# Patient Record
Sex: MALE | Race: WHITE | NOT HISPANIC OR LATINO | Employment: OTHER | URBAN - METROPOLITAN AREA
[De-identification: names, ages, dates, MRNs, and addresses within clinical notes are randomized per-mention and may not be internally consistent; named-entity substitution may affect disease eponyms.]

---

## 2017-04-18 ENCOUNTER — OFFICE VISIT (OUTPATIENT)
Dept: MEDICAL GROUP | Facility: MEDICAL CENTER | Age: 57
End: 2017-04-18
Payer: COMMERCIAL

## 2017-04-18 VITALS
OXYGEN SATURATION: 96 % | DIASTOLIC BLOOD PRESSURE: 90 MMHG | RESPIRATION RATE: 16 BRPM | SYSTOLIC BLOOD PRESSURE: 152 MMHG | HEIGHT: 70 IN | HEART RATE: 72 BPM | BODY MASS INDEX: 30.24 KG/M2 | WEIGHT: 211.2 LBS | TEMPERATURE: 98 F

## 2017-04-18 DIAGNOSIS — E83.119 HEMOCHROMATOSIS, UNSPECIFIED HEMOCHROMATOSIS TYPE: ICD-10-CM

## 2017-04-18 DIAGNOSIS — N18.30 CHRONIC RENAL INSUFFICIENCY, STAGE III (MODERATE) (HCC): ICD-10-CM

## 2017-04-18 DIAGNOSIS — E78.49 OTHER HYPERLIPIDEMIA: ICD-10-CM

## 2017-04-18 DIAGNOSIS — E79.0 HYPERURICEMIA: ICD-10-CM

## 2017-04-18 DIAGNOSIS — K76.0 FATTY LIVER: ICD-10-CM

## 2017-04-18 DIAGNOSIS — Z12.5 SCREENING PSA (PROSTATE SPECIFIC ANTIGEN): ICD-10-CM

## 2017-04-18 PROCEDURE — 99214 OFFICE O/P EST MOD 30 MIN: CPT | Performed by: PHYSICIAN ASSISTANT

## 2017-04-18 RX ORDER — LOVASTATIN 20 MG/1
TABLET ORAL
Qty: 90 TAB | Refills: 3 | Status: SHIPPED | OUTPATIENT
Start: 2017-04-18 | End: 2018-04-03 | Stop reason: SDUPTHER

## 2017-04-18 RX ORDER — ALLOPURINOL 300 MG/1
TABLET ORAL
Qty: 90 TAB | Refills: 3 | Status: SHIPPED | OUTPATIENT
Start: 2017-04-18 | End: 2018-04-20 | Stop reason: SDUPTHER

## 2017-04-18 NOTE — ASSESSMENT & PLAN NOTE
Chronic. On allopurinol. States he was told by podiatrist that he doesn't have gout. Due for uric acid level.

## 2017-04-18 NOTE — MR AVS SNAPSHOT
"        Charlie Hernandezandreina   2017 7:20 AM   Office Visit   MRN: [id number]    Department:  [facility]   Dept Phone:  [phone]    Description:  Male : 1960   Provider:  Felisha Callahan PA-C           Reason for Visit     Establish Care     Medication Refill Lovastatin, Allopurinoll      Allergies as of 2017     No Known Allergies      You were diagnosed with     Other hyperlipidemia   [1373123]       Chronic renal insufficiency, stage III (moderate)   [703230]       Fatty liver   [061038]       Hemochromatosis, unspecified hemochromatosis type   [7749101]       Hyperuricemia   [419568]       Screening PSA (prostate specific antigen)   [637527]         Vital Signs     Blood Pressure Pulse Temperature Respirations Height Weight    152/90 mmHg 72 36.7 °C (98 °F) 16 1.778 m (5' 10\") 95.8 kg (211 lb 3.2 oz)    Body Mass Index Oxygen Saturation Smoking Status             30.30 kg/m2 96% Never Smoker          Basic Information     Date Of Birth Sex Race Ethnicity Preferred Language    1960 Male White Non- English      Problem List              ICD-10-CM Priority Class Noted - Resolved    Hyperlipidemia E78.5   2010 - Present    Chronic renal insufficiency, stage III (moderate) N18.3   2013 - Present    Fatty liver K76.0   2013 - Present    Hemochromatosis E83.119   2014 - Present    Gout, arthropathy M10.9   2015 - Present    Hyperuricemia E79.0   2015 - Present      Health Maintenance        Date Due Completion Dates    PSA Screening 2015, 10/19/2010    COLONOSCOPY 2019 (N/S)    Override on 2009: (N/S)    IMM DTaP/Tdap/Td Vaccine (2 - Td) 2025            Current Immunizations     Influenza TIV (IM) 10/23/2013  8:15 AM, 10/31/2011  8:20 AM    Influenza Vaccine Quad Inj (Pf) 2015  8:20 AM, 11/3/2014  8:35 AM    Influenza Vaccine Quad Inj (Preserved) 2016  8:30 AM    Tdap Vaccine 2015  8:20 AM      Below " and/or attached are the medications your provider expects you to take. Review all of your home medications and newly ordered medications with your provider and/or pharmacist. Follow medication instructions as directed by your provider and/or pharmacist. Please keep your medication list with you and share with your provider. Update the information when medications are discontinued, doses are changed, or new medications (including over-the-counter products) are added; and carry medication information at all times in the event of emergency situations     Allergies:  No Known Allergies          Medications  Valid as of: April 18, 2017 -  7:50 AM    Generic Name Brand Name Tablet Size Instructions for use    Acyclovir (Cream) ZOVIRAX 5 % Apply as directed        Allopurinol (Tab) ZYLOPRIM 300 MG TAKE ONE TABLET BY MOUTH ONCE DAILY        Aspirin (Tab) aspirin 81 MG Take  by mouth every day.        L-Methylfolate-Algae-B12-B6 (Cap) METANX 3-90.314-2-35 MG Take  by mouth 2 Times a Day.        Lovastatin (Tab) MEVACOR 20 MG TAKE ONE TABLET BY MOUTH ONCE DAILY        ValACYclovir HCl (Tab) VALTREX 1 GM TAKE TWO TABLETS BY MOUTH EVERY 12 HOURS TAKE FOR 1 DAY AS NEEDED FOR OUTBREAKS        .                 Medicines prescribed today were sent to:     [facility] [address] [place] [address] (S), [place] - [address] [id number] [facility] (S) [place] [address]    Phone: [phone] Fax: [phone]    Open 24 Hours?: No      Medication refill instructions:       If your prescription bottle indicates you have medication refills left, it is not necessary to call your provider’s office. Please contact your pharmacy and they will refill your medication.    If your prescription bottle indicates you do not have any refills left, you may request refills at any time through one of the following ways: The online [employer] system (except Urgent Care), by calling your provider’s office, or by asking your pharmacy to contact your provider’s office with  a refill request. Medication refills are processed only during regular business hours and may not be available until the next business day. Your provider may request additional information or to have a follow-up visit with you prior to refilling your medication.   *Please Note: Medication refills are assigned a new Rx number when refilled electronically. Your pharmacy may indicate that no refills were authorized even though a new prescription for the same medication is available at the pharmacy. Please request the medicine by name with the pharmacy before contacting your provider for a refill.        Your To Do List     Future Labs/Procedures Complete By Expires    CBC WITH DIFFERENTIAL  As directed 4/18/2018    COMP METABOLIC PANEL  As directed 4/18/2018    FERRITIN  As directed 4/18/2018    LIPID PROFILE  As directed 4/18/2018    PROSTATE SPECIFIC AG SCREENING  As directed 4/18/2018         BetaVersityhart Access Code: Activation code not generated  Current NewACT Status: Active

## 2017-04-18 NOTE — PROGRESS NOTES
"Subjective:   Charlie Avila is a 57 y.o. male here today for medication refills and lab order. Doing well. No changes since last visit 11/2016.    Hyperlipidemia  Chronic stable on lovastatin. Due for labs.    Chronic renal insufficiency, stage III (moderate)  Chronic, stable. Due for labs.    Hemochromatosis  Chronic, stable. Goes for phlebotomy 3 times per year. Due for CBC and ferritin check.    Hyperuricemia  Chronic. On allopurinol. States he was told by podiatrist that he doesn't have gout. Due for uric acid level.     HSV - recurrent but no recent outbreaks.    Current medicines (including changes today)  Current Outpatient Prescriptions   Medication Sig Dispense Refill   • allopurinol (ZYLOPRIM) 300 MG Tab TAKE ONE TABLET BY MOUTH ONCE DAILY 90 Tab 3   • lovastatin (MEVACOR) 20 MG Tab TAKE ONE TABLET BY MOUTH ONCE DAILY 90 Tab 3   • ASPIRIN 81 MG tablet Take  by mouth every day.     • valacyclovir (VALTREX) 1 GM TABS TAKE TWO TABLETS BY MOUTH EVERY 12 HOURS TAKE FOR 1 DAY AS NEEDED FOR OUTBREAKS 4 Tab 6   • acyclovir (ZOVIRAX) 5 % CREA Apply as directed 1 Tube 6   • L-Methylfolate-Algae-B12-B6 (METANX) 3-90.314-2-35 MG CAPS Take  by mouth 2 Times a Day.       No current facility-administered medications for this visit.     He  has a past medical history of Hyperlipidemia (4/26/2010); HEMOCHROMATOSIS (4/26/2010); HSV-1 (herpes simplex virus 1) infection; and Fatty liver (4/30/2013). He also has no past medical history of Hypertension.    ROS   No fever/chills. No weight change. No headache/dizziness. No focal weakness. No sore throat, nasal congestion, ear pain. No chest pain, no shortness of breath, difficulty breathing. No n/v/d/c or abdominal pain. No urinary complaint. No rash or skin lesion. No joint pain or swelling.       Objective:     Blood pressure 152/90, pulse 72, temperature 36.7 °C (98 °F), resp. rate 16, height 1.778 m (5' 10\"), weight 95.8 kg (211 lb 3.2 oz), SpO2 96 %. Body mass index is 30.3 " kg/(m^2).   Physical Exam:  Constitutional: WDWN, NAD  Skin: Warm, dry, good turgor, no rashes in visible areas.  Eye: Equal, round and reactive, conjunctiva clear, lids normal.  ENMT: Lips without lesions, good dentition, oropharynx clear.  Neck: Trachea midline, no masses, no thyromegaly. No cervical or supraclavicular lymphadenopathy  Respiratory: Unlabored respiratory effort, lungs clear to auscultation, no wheezes, no ronchi.  Cardiovascular: Normal S1, S2, no murmur, no leg edema.  Psych: Alert and oriented x3, normal affect and mood.        Assessment and Plan:   The following treatment plan was discussed    1. Other hyperlipidemia  Cont current tx  - lovastatin (MEVACOR) 20 MG Tab; TAKE ONE TABLET BY MOUTH ONCE DAILY  Dispense: 90 Tab; Refill: 3  - LIPID PROFILE; Future    2. Chronic renal insufficiency, stage III (moderate)  Cont avoid nephrotoxins  - COMP METABOLIC PANEL; Future    3. Fatty liver    - COMP METABOLIC PANEL; Future    4. Hemochromatosis, unspecified hemochromatosis type  Cont current phlebotomy 3 times per year  - CBC WITH DIFFERENTIAL; Future  - FERRITIN; Future    5. Hyperuricemia    - allopurinol (ZYLOPRIM) 300 MG Tab; TAKE ONE TABLET BY MOUTH ONCE DAILY  Dispense: 90 Tab; Refill: 3  - URIC ACID, SERUM    6. Screening PSA (prostate specific antigen)    - PROSTATE SPECIFIC AG SCREENING; Future    Will call with lab results. meds refilled    Followup: Return in about 6 months (around 10/18/2017).

## 2017-09-25 ENCOUNTER — TELEPHONE (OUTPATIENT)
Dept: MEDICAL GROUP | Facility: MEDICAL CENTER | Age: 57
End: 2017-09-25

## 2017-09-25 NOTE — TELEPHONE ENCOUNTER
Future Appointments       Provider Department    10/18/2017 7:20 AM Felisha Callahan P.A.-C. [facility]        Pt. With an upcoming mayte----needs to know if you'd like for him to get labs prior his mayte??  Felisha, please order blood tests or advise....

## 2017-09-26 NOTE — TELEPHONE ENCOUNTER
"Per Emperatriz  \"It looks like he had normal labs in April. I'll probably check a HgA1C in the office but other than that I don't need anything prior to the visit. Thanks! Felisha\"  I spoke with pt and informed. LP  "

## 2017-10-18 ENCOUNTER — OFFICE VISIT (OUTPATIENT)
Dept: MEDICAL GROUP | Facility: MEDICAL CENTER | Age: 57
End: 2017-10-18
Payer: COMMERCIAL

## 2017-10-18 VITALS
DIASTOLIC BLOOD PRESSURE: 70 MMHG | OXYGEN SATURATION: 96 % | SYSTOLIC BLOOD PRESSURE: 120 MMHG | WEIGHT: 197 LBS | HEART RATE: 80 BPM | TEMPERATURE: 98.6 F | HEIGHT: 70 IN | BODY MASS INDEX: 28.2 KG/M2 | RESPIRATION RATE: 16 BRPM

## 2017-10-18 DIAGNOSIS — E79.0 HYPERURICEMIA: ICD-10-CM

## 2017-10-18 DIAGNOSIS — Z23 NEED FOR INFLUENZA VACCINATION: ICD-10-CM

## 2017-10-18 DIAGNOSIS — E83.119 HEMOCHROMATOSIS, UNSPECIFIED HEMOCHROMATOSIS TYPE: ICD-10-CM

## 2017-10-18 DIAGNOSIS — E78.49 OTHER HYPERLIPIDEMIA: ICD-10-CM

## 2017-10-18 DIAGNOSIS — M10.9 GOUT, ARTHROPATHY: ICD-10-CM

## 2017-10-18 DIAGNOSIS — F43.9 STRESS AT HOME: ICD-10-CM

## 2017-10-18 DIAGNOSIS — K76.0 FATTY LIVER: ICD-10-CM

## 2017-10-18 PROCEDURE — 99214 OFFICE O/P EST MOD 30 MIN: CPT | Mod: 25 | Performed by: PHYSICIAN ASSISTANT

## 2017-10-18 PROCEDURE — 90686 IIV4 VACC NO PRSV 0.5 ML IM: CPT | Performed by: PHYSICIAN ASSISTANT

## 2017-10-18 PROCEDURE — 90471 IMMUNIZATION ADMIN: CPT | Performed by: PHYSICIAN ASSISTANT

## 2017-10-20 PROBLEM — F43.9 STRESS AT HOME: Status: ACTIVE | Noted: 2017-10-20

## 2017-10-20 NOTE — ASSESSMENT & PLAN NOTE
Has not been dx as gout but gets gout like swelling and pain in feet. On allopurinol prevents attacks. Normal uric acid level.

## 2017-10-20 NOTE — PROGRESS NOTES
"Subjective:   Charlie Avila is a 57 y.o. male here today for cholesterol, gout, general f/u    Hyperlipidemia  Most recent labs from 2017:  Total cholesterol: 179  T  HDL: 35  LDL: 115    Hemochromatosis  Hx of. Treated with twice yearly therapeutic phlebotomy    Gout, arthropathy  Has not been dx as gout but gets gout like swelling and pain in feet. On allopurinol prevents attacks. Normal uric acid level.    Hyperuricemia  On allopurinol. Most recent uric acid level normal.    Stress at home  Long term girlfriend moved out few months ago, took money, very stressful.     Labs from 2017 reviewed. Due for new labs    Current medicines (including changes today)  Current Outpatient Prescriptions   Medication Sig Dispense Refill   • allopurinol (ZYLOPRIM) 300 MG Tab TAKE ONE TABLET BY MOUTH ONCE DAILY 90 Tab 3   • lovastatin (MEVACOR) 20 MG Tab TAKE ONE TABLET BY MOUTH ONCE DAILY 90 Tab 3   • ASPIRIN 81 MG tablet Take  by mouth every day.     • valacyclovir (VALTREX) 1 GM TABS TAKE TWO TABLETS BY MOUTH EVERY 12 HOURS TAKE FOR 1 DAY AS NEEDED FOR OUTBREAKS 4 Tab 6   • L-Methylfolate-Algae-B12-B6 (METANX) 3-90.314-2-35 MG CAPS Take  by mouth 2 Times a Day.       No current facility-administered medications for this visit.      He  has a past medical history of Fatty liver (2013); HEMOCHROMATOSIS (2010); HSV-1 (herpes simplex virus 1) infection; and Hyperlipidemia (2010). He also has no past medical history of Hypertension.    ROS   No fever/chills. No weight change. No headache/dizziness. No focal weakness. No sore throat, nasal congestion, ear pain. No chest pain, no shortness of breath, difficulty breathing. No n/v/d/c or abdominal pain. No urinary complaint. No rash or skin lesion. No joint pain or swelling.       Objective:     Blood pressure 120/70, pulse 80, temperature 37 °C (98.6 °F), resp. rate 16, height 1.778 m (5' 10\"), weight 89.4 kg (197 lb), SpO2 96 %. Body mass index is 28.27 kg/m². "   Physical Exam:  Constitutional: WDWN, NAD  Skin: Warm, dry, good turgor, no rashes in visible areas.  Neck: Trachea midline, no masses, no thyromegaly. No cervical or supraclavicular lymphadenopathy  Respiratory: Unlabored respiratory effort, lungs clear to auscultation, no wheezes, no ronchi.  Cardiovascular: Normal S1, S2, no murmur, no leg edema.  Psych: Alert and oriented x3, normal affect and mood.        Assessment and Plan:   The following treatment plan was discussed. Cont current meds. Diet and exercise discussed.    1. Other hyperlipidemia    - LIPID PROFILE; Future    2. Fatty liver    - COMP METABOLIC PANEL; Future    3. Need for influenza vaccination    - INFLUENZA VACCINE QUAD INJ >3Y(PF)    4. Hemochromatosis, unspecified hemochromatosis type      5. Gout, arthropathy      6. Hyperuricemia      7. Stress at home        Followup: Return in about 6 months (around 4/18/2018).

## 2018-04-03 DIAGNOSIS — E78.49 OTHER HYPERLIPIDEMIA: ICD-10-CM

## 2018-04-03 RX ORDER — LOVASTATIN 20 MG/1
TABLET ORAL
Qty: 90 TAB | Refills: 3 | Status: SHIPPED | OUTPATIENT
Start: 2018-04-03 | End: 2019-04-01 | Stop reason: SDUPTHER

## 2018-04-03 NOTE — TELEPHONE ENCOUNTER
Was the patient seen in the last year in this department? Yes     Does patient have an active prescription for medications requested? Yes     Received Request Via: Pharmacy       Last lipid panel 10/2017

## 2018-04-16 ENCOUNTER — OFFICE VISIT (OUTPATIENT)
Dept: MEDICAL GROUP | Facility: MEDICAL CENTER | Age: 58
End: 2018-04-16
Payer: COMMERCIAL

## 2018-04-16 VITALS
OXYGEN SATURATION: 95 % | DIASTOLIC BLOOD PRESSURE: 84 MMHG | RESPIRATION RATE: 16 BRPM | WEIGHT: 205.6 LBS | HEIGHT: 70 IN | HEART RATE: 60 BPM | SYSTOLIC BLOOD PRESSURE: 128 MMHG | BODY MASS INDEX: 29.43 KG/M2

## 2018-04-16 DIAGNOSIS — Z12.5 SCREENING PSA (PROSTATE SPECIFIC ANTIGEN): ICD-10-CM

## 2018-04-16 DIAGNOSIS — Z00.00 WELLNESS EXAMINATION: ICD-10-CM

## 2018-04-16 DIAGNOSIS — M10.9 GOUT, ARTHROPATHY: ICD-10-CM

## 2018-04-16 DIAGNOSIS — K76.0 FATTY LIVER: ICD-10-CM

## 2018-04-16 DIAGNOSIS — E78.49 OTHER HYPERLIPIDEMIA: ICD-10-CM

## 2018-04-16 DIAGNOSIS — E83.119 HEMOCHROMATOSIS, UNSPECIFIED HEMOCHROMATOSIS TYPE: ICD-10-CM

## 2018-04-16 PROCEDURE — 99396 PREV VISIT EST AGE 40-64: CPT | Performed by: PHYSICIAN ASSISTANT

## 2018-04-16 ASSESSMENT — PATIENT HEALTH QUESTIONNAIRE - PHQ9: CLINICAL INTERPRETATION OF PHQ2 SCORE: 0

## 2018-04-17 PROBLEM — F43.9 STRESS AT HOME: Status: RESOLVED | Noted: 2017-10-20 | Resolved: 2018-04-17

## 2018-04-17 NOTE — PROGRESS NOTES
"Subjective:   Charlie Avila is a 58 y.o. male here today for general wellness exam. Doing well overall.     Gout, arthropathy  No gout flare. Will continue with the allopurinol.    Hyperlipidemia  Chronic, stable on current lovastatin 20mg daily, no concerns, labs up to date    Hemochromatosis  Chronic, stable on current therapeutic phlebotomy       Current medicines (including changes today)  Current Outpatient Prescriptions   Medication Sig Dispense Refill   • lovastatin (MEVACOR) 20 MG Tab TAKE ONE TABLET BY MOUTH ONCE DAILY 90 Tab 3   • valacyclovir (VALTREX) 1 GM TABS TAKE TWO TABLETS BY MOUTH EVERY 12 HOURS TAKE FOR 1 DAY AS NEEDED FOR OUTBREAKS 4 Tab 6   • L-Methylfolate-Algae-B12-B6 (METANX) 3-90.314-2-35 MG CAPS Take  by mouth 2 Times a Day.     • ASPIRIN 81 MG tablet Take  by mouth every day.     • allopurinol (ZYLOPRIM) 300 MG Tab TAKE ONE TABLET BY MOUTH ONCE DAILY 90 Tab 3     No current facility-administered medications for this visit.      He  has a past medical history of Fatty liver (4/30/2013); HEMOCHROMATOSIS (4/26/2010); HSV-1 (herpes simplex virus 1) infection; and Hyperlipidemia (4/26/2010).    ROS  No fever/chills. No weight change. No headache/dizziness. No focal weakness. No sore throat, nasal congestion, ear pain. No chest pain, no shortness of breath, difficulty breathing. No n/v/d/c or abdominal pain. No urinary complaint. No rash or skin lesion. No joint pain or swelling.       Objective:     Blood pressure 128/84, pulse 60, resp. rate 16, height 1.778 m (5' 10\"), weight 93.3 kg (205 lb 9.6 oz), SpO2 95 %. Body mass index is 29.5 kg/m².   Physical Exam:  Constitutional: WDWN, NAD  Skin: Warm, dry, good turgor, no rashes in visible areas.  Eye: Equal, round and reactive, conjunctiva clear, lids normal.  ENMT: Lips without lesions, good dentition, oropharynx clear.  Neck: Trachea midline, no masses, no thyromegaly. No cervical or supraclavicular lymphadenopathy  Respiratory: Unlabored " respiratory effort, lungs clear to auscultation, no wheezes, no ronchi.  Cardiovascular: Normal S1, S2, no murmur, no leg edema.  Abdomen: Soft, non-tender, no masses, no hepatosplenomegaly.  Psych: Alert and oriented x3, normal affect and mood.        Assessment and Plan:   The following treatment plan was discussed    1. Wellness examination      2. Other hyperlipidemia    - LIPID PROFILE; Future    3. Gout, arthropathy      4. Hemochromatosis, unspecified hemochromatosis type    - CBC WITH DIFFERENTIAL; Future  - FERRITIN; Future  - IRON/TOTAL IRON BIND; Future    5. Fatty liver    - COMP METABOLIC PANEL; Future    6. Screening PSA (prostate specific antigen)    - PROSTATE SPECIFIC AG SCREENING; Future      Followup: Return in about 1 year (around 4/16/2019).

## 2018-04-20 DIAGNOSIS — E79.0 HYPERURICEMIA: ICD-10-CM

## 2018-04-20 RX ORDER — ALLOPURINOL 300 MG/1
TABLET ORAL
Qty: 90 TAB | Refills: 3 | Status: SHIPPED | OUTPATIENT
Start: 2018-04-20 | End: 2018-11-05 | Stop reason: SDUPTHER

## 2018-10-03 DIAGNOSIS — Z12.5 SCREENING PSA (PROSTATE SPECIFIC ANTIGEN): ICD-10-CM

## 2018-10-03 DIAGNOSIS — E78.49 OTHER HYPERLIPIDEMIA: ICD-10-CM

## 2018-10-03 DIAGNOSIS — E83.119 HEMOCHROMATOSIS, UNSPECIFIED HEMOCHROMATOSIS TYPE: ICD-10-CM

## 2018-10-03 DIAGNOSIS — K76.0 FATTY LIVER: ICD-10-CM

## 2018-10-15 ENCOUNTER — NON-PROVIDER VISIT (OUTPATIENT)
Dept: MEDICAL GROUP | Facility: MEDICAL CENTER | Age: 58
End: 2018-10-15
Payer: COMMERCIAL

## 2018-10-15 DIAGNOSIS — Z23 NEED FOR VACCINATION: ICD-10-CM

## 2018-10-15 PROCEDURE — 90471 IMMUNIZATION ADMIN: CPT | Performed by: FAMILY MEDICINE

## 2018-10-15 PROCEDURE — 90686 IIV4 VACC NO PRSV 0.5 ML IM: CPT | Performed by: FAMILY MEDICINE

## 2018-11-05 DIAGNOSIS — E79.0 HYPERURICEMIA: ICD-10-CM

## 2018-11-05 RX ORDER — ALLOPURINOL 300 MG/1
TABLET ORAL
Qty: 90 TAB | Refills: 3 | Status: SHIPPED | OUTPATIENT
Start: 2018-11-05 | End: 2019-11-18 | Stop reason: SDUPTHER

## 2018-11-05 NOTE — TELEPHONE ENCOUNTER
Was the patient seen in the last year in this department? Yes    Does patient have an active prescription for medications requested? Yes    Received Request Via: Pharmacy     Pt switched pharmacies, requesting a 90 day supply be  sent to [facility] on [address].    Please advise, thank you.

## 2019-04-01 DIAGNOSIS — E78.49 OTHER HYPERLIPIDEMIA: ICD-10-CM

## 2019-04-02 RX ORDER — LOVASTATIN 20 MG/1
TABLET ORAL
Qty: 90 TAB | Refills: 0 | Status: SHIPPED | OUTPATIENT
Start: 2019-04-02 | End: 2019-07-03 | Stop reason: SDUPTHER

## 2019-04-25 ENCOUNTER — OFFICE VISIT (OUTPATIENT)
Dept: MEDICAL GROUP | Facility: MEDICAL CENTER | Age: 59
End: 2019-04-25
Payer: COMMERCIAL

## 2019-04-25 VITALS
RESPIRATION RATE: 14 BRPM | DIASTOLIC BLOOD PRESSURE: 84 MMHG | HEIGHT: 70 IN | WEIGHT: 200.8 LBS | SYSTOLIC BLOOD PRESSURE: 136 MMHG | TEMPERATURE: 98.2 F | HEART RATE: 83 BPM | BODY MASS INDEX: 28.75 KG/M2 | OXYGEN SATURATION: 96 %

## 2019-04-25 DIAGNOSIS — E78.49 OTHER HYPERLIPIDEMIA: ICD-10-CM

## 2019-04-25 DIAGNOSIS — E83.119 HEMOCHROMATOSIS, UNSPECIFIED HEMOCHROMATOSIS TYPE: ICD-10-CM

## 2019-04-25 DIAGNOSIS — Z00.00 WELLNESS EXAMINATION: ICD-10-CM

## 2019-04-25 DIAGNOSIS — M10.9 GOUT, ARTHROPATHY: ICD-10-CM

## 2019-04-25 DIAGNOSIS — Z12.5 SCREENING PSA (PROSTATE SPECIFIC ANTIGEN): ICD-10-CM

## 2019-04-25 PROCEDURE — 99396 PREV VISIT EST AGE 40-64: CPT | Performed by: PHYSICIAN ASSISTANT

## 2019-04-25 ASSESSMENT — PATIENT HEALTH QUESTIONNAIRE - PHQ9: CLINICAL INTERPRETATION OF PHQ2 SCORE: 0

## 2019-04-25 NOTE — PROGRESS NOTES
"Subjective:   Charlie Avila is a 59 y.o. male here today for annual wellness exam. Non-smoker, no HTN or diabetes. No new concerns.    Hyperlipidemia  Chronic, stable on current statin, no myalgias, labs up to date    Hemochromatosis  Chronic, stable with current phlebotomy three times a year    Gout, arthropathy  No flares with current allopurinol       Current medicines (including changes today)  Current Outpatient Prescriptions   Medication Sig Dispense Refill   • lovastatin (MEVACOR) 20 MG Tab TAKE 1 TABLET BY MOUTH ONCE DAILY 90 Tab 0   • allopurinol (ZYLOPRIM) 300 MG Tab TAKE ONE TABLET BY MOUTH ONCE DAILY 90 Tab 3   • ASPIRIN 81 MG tablet Take  by mouth every day.     • valacyclovir (VALTREX) 1 GM TABS TAKE TWO TABLETS BY MOUTH EVERY 12 HOURS TAKE FOR 1 DAY AS NEEDED FOR OUTBREAKS 4 Tab 6   • L-Methylfolate-Algae-B12-B6 (METANX) 3-90.314-2-35 MG CAPS Take  by mouth 2 Times a Day.       No current facility-administered medications for this visit.      He  has a past medical history of Fatty liver (4/30/2013); HEMOCHROMATOSIS (4/26/2010); HSV-1 (herpes simplex virus 1) infection; and Hyperlipidemia (4/26/2010).    ROS   No fever/chills. No weight change. No headache/dizziness. No focal weakness. No sore throat, nasal congestion, ear pain. No chest pain, no shortness of breath, difficulty breathing. No n/v/d/c or abdominal pain. No urinary complaint. No rash or skin lesion. No joint pain or swelling.       Objective:     /84 (BP Location: Right arm, Patient Position: Sitting, BP Cuff Size: Adult)   Pulse 83   Temp 36.8 °C (98.2 °F) (Temporal)   Resp 14   Ht 1.778 m (5' 10\")   Wt 91.1 kg (200 lb 12.8 oz)   SpO2 96%  Body mass index is 28.81 kg/m².   Physical Exam:  Constitutional: WDWN, NAD  Skin: Warm, dry, good turgor, no rashes in visible areas.  Eye: Equal, round and reactive, conjunctiva clear, lids normal.  ENMT: Lips without lesions, good dentition, oropharynx clear.  Neck: Trachea midline, no " masses, no thyromegaly. No cervical or supraclavicular lymphadenopathy  Respiratory: Unlabored respiratory effort, lungs clear to auscultation, no wheezes, no ronchi.  Cardiovascular: Normal S1, S2, no murmur, no leg edema.  Psych: Alert and oriented x3, normal affect and mood.    Assessment and Plan:   The following treatment plan was discussed    1. Wellness examination    - Comp Metabolic Panel; Future    2. Other hyperlipidemia    - Lipid Profile; Future    3. Hemochromatosis, unspecified hemochromatosis type    - CBC WITH DIFFERENTIAL; Future  - FERRITIN; Future    4. Screening PSA (prostate specific antigen)    - PROSTATE SPECIFIC AG SCREENING; Future    5. Gout, arthropathy    - URIC ACID; Future      Followup: Return in about 1 year (around 4/25/2020).

## 2019-05-09 NOTE — PROGRESS NOTES
"Charlie Avila is a 58 y.o. male here for a non-provider visit for:   FLU    Reason for immunization: Annual Flu Vaccine  Immunization records indicate need for vaccine: Yes, confirmed with MOHSEN Mejía  Minimum interval has been met for this vaccine: Yes  ABN completed: Not Indicated    Order and dose verified by: ELIZABETH  VIS Dated  8/7/15 was given to patient: Yes  All IAC Questionnaire questions were answered \"No.\"    Patient tolerated injection and no adverse effects were observed or reported: Yes    Pt scheduled for next dose in series: Not Indicated    " Good

## 2019-07-03 DIAGNOSIS — E78.49 OTHER HYPERLIPIDEMIA: ICD-10-CM

## 2019-07-03 RX ORDER — LOVASTATIN 20 MG/1
TABLET ORAL
Qty: 90 TAB | Refills: 2 | Status: SHIPPED | OUTPATIENT
Start: 2019-07-03 | End: 2020-04-06

## 2019-09-27 ENCOUNTER — NON-PROVIDER VISIT (OUTPATIENT)
Dept: MEDICAL GROUP | Facility: MEDICAL CENTER | Age: 59
End: 2019-09-27
Payer: COMMERCIAL

## 2019-09-27 DIAGNOSIS — Z23 NEED FOR VACCINATION: ICD-10-CM

## 2019-09-27 DIAGNOSIS — Z23 NEED FOR SHINGLES VACCINE: ICD-10-CM

## 2019-09-27 PROCEDURE — 90750 HZV VACC RECOMBINANT IM: CPT | Performed by: PHYSICIAN ASSISTANT

## 2019-09-27 PROCEDURE — 90472 IMMUNIZATION ADMIN EACH ADD: CPT | Performed by: PHYSICIAN ASSISTANT

## 2019-09-27 PROCEDURE — 90471 IMMUNIZATION ADMIN: CPT | Performed by: PHYSICIAN ASSISTANT

## 2019-09-27 PROCEDURE — 90686 IIV4 VACC NO PRSV 0.5 ML IM: CPT | Performed by: PHYSICIAN ASSISTANT

## 2019-09-27 NOTE — PROGRESS NOTES
"Charlie Avila is a 59 y.o. male here for a non-provider visit for:   FLU + shingrix     Reason for immunization: Annual Flu Vaccine 1 shingrix  Immunization records indicate need for vaccine: Yes, confirmed with MOHSEN Mejía  Minimum interval has been met for this vaccine: Yes  ABN completed: No    Order and dose verified by: isabel  VIS Dated  8/15/2019 - 2/12/18 shingrix was given to patient: Yes  All IAC Questionnaire questions were answered \"No.\"    Patient tolerated injection and no adverse effects were observed or reported: Yes    Pt scheduled for next dose in series: Yes for 2 nd shingrix 11-    "

## 2019-11-18 DIAGNOSIS — E79.0 HYPERURICEMIA: ICD-10-CM

## 2019-11-19 RX ORDER — ALLOPURINOL 300 MG/1
TABLET ORAL
Qty: 90 TAB | Refills: 2 | Status: SHIPPED | OUTPATIENT
Start: 2019-11-19 | End: 2020-08-17

## 2019-12-02 ENCOUNTER — NON-PROVIDER VISIT (OUTPATIENT)
Dept: MEDICAL GROUP | Facility: MEDICAL CENTER | Age: 59
End: 2019-12-02
Payer: COMMERCIAL

## 2019-12-02 DIAGNOSIS — Z23 NEED FOR VACCINATION: ICD-10-CM

## 2019-12-02 PROCEDURE — 90471 IMMUNIZATION ADMIN: CPT | Performed by: PHYSICIAN ASSISTANT

## 2019-12-02 PROCEDURE — 90750 HZV VACC RECOMBINANT IM: CPT | Performed by: PHYSICIAN ASSISTANT

## 2019-12-02 NOTE — NON-PROVIDER
"Charlie Avila is a 59 y.o. male here for a non-provider visit for:   SHINGRIX (Shingles)    Reason for immunization: continue or complete series started at the office  Immunization records indicate need for vaccine: Yes, confirmed with Epic  Minimum interval has been met for this vaccine: Yes  ABN completed: Not Indicated    Order and dose verified by: TIMI  VIS Dated  10/30/19 was given to patient: No  All IAC Questionnaire questions were answered \"No.\"    Patient tolerated injection and no adverse effects were observed or reported: Yes    Pt scheduled for next dose in series: Not Indicated    "

## 2019-12-28 DIAGNOSIS — B00.9 HSV INFECTION: ICD-10-CM

## 2020-01-02 RX ORDER — VALACYCLOVIR HYDROCHLORIDE 1 G/1
TABLET, FILM COATED ORAL
Qty: 12 TAB | Refills: 3 | Status: SHIPPED | OUTPATIENT
Start: 2020-01-02 | End: 2020-01-06

## 2020-01-05 DIAGNOSIS — B00.9 HSV INFECTION: ICD-10-CM

## 2020-01-06 RX ORDER — VALACYCLOVIR HYDROCHLORIDE 1 G/1
TABLET, FILM COATED ORAL
Qty: 4 TAB | Refills: 3 | Status: SHIPPED | OUTPATIENT
Start: 2020-01-06 | End: 2020-06-02

## 2020-04-06 DIAGNOSIS — E78.49 OTHER HYPERLIPIDEMIA: ICD-10-CM

## 2020-04-06 RX ORDER — LOVASTATIN 20 MG/1
TABLET ORAL
Qty: 90 TAB | Refills: 0 | Status: SHIPPED | OUTPATIENT
Start: 2020-04-06 | End: 2020-07-13

## 2020-06-02 ENCOUNTER — OFFICE VISIT (OUTPATIENT)
Dept: MEDICAL GROUP | Facility: MEDICAL CENTER | Age: 60
End: 2020-06-02
Payer: COMMERCIAL

## 2020-06-02 VITALS
WEIGHT: 201.8 LBS | DIASTOLIC BLOOD PRESSURE: 92 MMHG | HEART RATE: 67 BPM | TEMPERATURE: 97.6 F | HEIGHT: 70 IN | OXYGEN SATURATION: 96 % | BODY MASS INDEX: 28.89 KG/M2 | SYSTOLIC BLOOD PRESSURE: 164 MMHG

## 2020-06-02 DIAGNOSIS — Z12.12 SCREENING FOR COLORECTAL CANCER: ICD-10-CM

## 2020-06-02 DIAGNOSIS — M10.9 GOUT, ARTHROPATHY: ICD-10-CM

## 2020-06-02 DIAGNOSIS — Z12.5 SCREENING PSA (PROSTATE SPECIFIC ANTIGEN): ICD-10-CM

## 2020-06-02 DIAGNOSIS — E78.49 OTHER HYPERLIPIDEMIA: ICD-10-CM

## 2020-06-02 DIAGNOSIS — Z11.59 NEED FOR HEPATITIS C SCREENING TEST: ICD-10-CM

## 2020-06-02 DIAGNOSIS — Z00.00 WELLNESS EXAMINATION: ICD-10-CM

## 2020-06-02 DIAGNOSIS — M25.50 ARTHRALGIA, UNSPECIFIED JOINT: ICD-10-CM

## 2020-06-02 DIAGNOSIS — E83.119 HEMOCHROMATOSIS, UNSPECIFIED HEMOCHROMATOSIS TYPE: ICD-10-CM

## 2020-06-02 DIAGNOSIS — Z12.11 SCREENING FOR COLORECTAL CANCER: ICD-10-CM

## 2020-06-02 PROCEDURE — 99396 PREV VISIT EST AGE 40-64: CPT | Performed by: PHYSICIAN ASSISTANT

## 2020-06-02 RX ORDER — NAPROXEN 375 MG/1
375 TABLET ORAL 2 TIMES DAILY WITH MEALS
Qty: 60 TAB | Refills: 11 | Status: SHIPPED | OUTPATIENT
Start: 2020-06-02 | End: 2021-09-17

## 2020-06-02 RX ORDER — LOVASTATIN 20 MG/1
TABLET ORAL
COMMUNITY
Start: 2020-05-05 | End: 2020-06-02

## 2020-06-02 ASSESSMENT — PATIENT HEALTH QUESTIONNAIRE - PHQ9: CLINICAL INTERPRETATION OF PHQ2 SCORE: 0

## 2020-06-02 NOTE — ASSESSMENT & PLAN NOTE
Podiatrist said it didn't look like gout but uric acid was high and allopurinol was recommended, still taking, due for uric acid in 10/2020

## 2020-06-02 NOTE — PROGRESS NOTES
"Subjective:   Charlie Avila is a 60 y.o. male here today for annual wellness exam. Generally well. Unable to go to gym during coronavirus which causes stress as this was him main form of exercise and social contact. Hoping to go back soon. No recent injury or illness. Due for colon cancer screening. Labs 10/2019 reviewed.    Gout, arthropathy  Podiatrist said it didn't look like gout but uric acid was high and allopurinol was recommended, still taking, due for uric acid in 10/2020    Hyperlipidemia  Chronic, taking statin as prescribed, labs in 10/2019 reviewed    Hemochromatosis  Gives blood three times a year. Labs from 10/2019 reviewed       Current medicines (including changes today)  Current Outpatient Medications   Medication Sig Dispense Refill   • naproxen (NAPROSYN) 375 MG Tab Take 1 Tab by mouth 2 times a day, with meals. 60 Tab 11   • lovastatin (MEVACOR) 20 MG Tab Take 1 tablet by mouth once daily 90 Tab 0   • allopurinol (ZYLOPRIM) 300 MG Tab take 1 tablet by mouth once daily 90 Tab 2   • L-Methylfolate-Algae-B12-B6 (METANX) 3-90.314-2-35 MG CAPS Take  by mouth 2 Times a Day.     • ASPIRIN 81 MG tablet Take  by mouth every day.       No current facility-administered medications for this visit.      He  has a past medical history of Fatty liver (4/30/2013), HEMOCHROMATOSIS (4/26/2010), HSV-1 (herpes simplex virus 1) infection, and Hyperlipidemia (4/26/2010).    ROS   No fever/chills. No weight change. No headache/dizziness. No focal weakness. No sore throat, nasal congestion, ear pain. No chest pain, no shortness of breath, difficulty breathing. No n/v/d/c or abdominal pain. No urinary complaint. No rash or skin lesion. No current joint pain or swelling.       Objective:     BP (!) 164/92 (BP Location: Right arm, Patient Position: Sitting, BP Cuff Size: Adult)   Pulse 67   Temp 36.4 °C (97.6 °F) (Temporal)   Ht 1.778 m (5' 10\")   Wt 91.5 kg (201 lb 12.8 oz)   SpO2 96%  Body mass index is 28.96 kg/m². "   Physical Exam:  Constitutional: WDWN, NAD  Skin: Warm, dry, good turgor, no rashes in visible areas.  Eye: Equal, round and reactive, conjunctiva clear, lids normal.  ENMT: Lips without lesions, good dentition, oropharynx clear.  Neck: Trachea midline, no masses, no thyromegaly. No cervical or supraclavicular lymphadenopathy  Respiratory: Unlabored respiratory effort, lungs clear to auscultation, no wheezes, no ronchi.  Cardiovascular: Normal S1, S2, no murmur, no leg edema.  Psych: Alert and oriented x3, normal affect and mood.    Assessment and Plan:   The following treatment plan was discussed    1. Wellness examination    - Comp Metabolic Panel; Future    2. Need for hepatitis C screening test    - HCV Scrn ( [id number] 1xLife); Future    3. Screening for colorectal cancer    - REFERRAL TO GI FOR COLONOSCOPY    4. Gout, arthropathy    - URIC ACID; Future    5. Other hyperlipidemia    - Lipid Profile; Future    6. Hemochromatosis, unspecified hemochromatosis type    - CBC WITH DIFFERENTIAL; Future  - IRON/TOTAL IRON BIND; Future  - FERRITIN; Future    7. Screening PSA (prostate specific antigen)    - PROSTATE SPECIFIC AG SCREENING; Future    8. Arthralgia, unspecified joint    - naproxen (NAPROSYN) 375 MG Tab; Take 1 Tab by mouth 2 times a day, with meals.  Dispense: 60 Tab; Refill: 11      Followup: yearly and as needed

## 2020-07-13 DIAGNOSIS — E78.49 OTHER HYPERLIPIDEMIA: ICD-10-CM

## 2020-07-13 RX ORDER — LOVASTATIN 20 MG/1
TABLET ORAL
Qty: 90 TAB | Refills: 3 | Status: SHIPPED | OUTPATIENT
Start: 2020-07-13 | End: 2021-07-26

## 2020-08-17 DIAGNOSIS — E79.0 HYPERURICEMIA: ICD-10-CM

## 2020-08-17 RX ORDER — ALLOPURINOL 300 MG/1
TABLET ORAL
Qty: 90 TAB | Refills: 3 | Status: SHIPPED | OUTPATIENT
Start: 2020-08-17 | End: 2021-08-24

## 2020-10-14 ENCOUNTER — NON-PROVIDER VISIT (OUTPATIENT)
Dept: MEDICAL GROUP | Facility: MEDICAL CENTER | Age: 60
End: 2020-10-14
Payer: COMMERCIAL

## 2020-10-14 DIAGNOSIS — Z23 NEED FOR VACCINATION: ICD-10-CM

## 2020-10-14 PROCEDURE — 90471 IMMUNIZATION ADMIN: CPT | Performed by: PHYSICIAN ASSISTANT

## 2020-10-14 PROCEDURE — 90686 IIV4 VACC NO PRSV 0.5 ML IM: CPT | Performed by: PHYSICIAN ASSISTANT

## 2020-10-14 NOTE — PROGRESS NOTES
"Charlie Avila is a 60 y.o. male here for a non-provider visit for:   FLU    Reason for immunization: Annual Flu Vaccine  Immunization records indicate need for vaccine: Yes, confirmed with Epic  Minimum interval has been met for this vaccine: Yes  ABN completed: Not Indicated    Order and dose verified by: NIKHIL  VIS Dated  08/15/2020 was given to patient: Yes  All IAC Questionnaire questions were answered \"No.\"    Patient tolerated injection and no adverse effects were observed or reported: Yes    Pt scheduled for next dose in series: Not Indicated    "

## 2021-03-15 DIAGNOSIS — Z23 NEED FOR VACCINATION: ICD-10-CM

## 2021-05-14 DIAGNOSIS — B00.9 HSV INFECTION: ICD-10-CM

## 2021-05-14 RX ORDER — VALACYCLOVIR HYDROCHLORIDE 1 G/1
TABLET, FILM COATED ORAL
Qty: 12 TABLET | Refills: 0 | Status: SHIPPED | OUTPATIENT
Start: 2021-05-14 | End: 2022-01-07

## 2021-05-24 ENCOUNTER — OFFICE VISIT (OUTPATIENT)
Dept: MEDICAL GROUP | Facility: MEDICAL CENTER | Age: 61
End: 2021-05-24
Payer: COMMERCIAL

## 2021-05-24 VITALS
HEIGHT: 70 IN | TEMPERATURE: 98.2 F | OXYGEN SATURATION: 97 % | HEART RATE: 63 BPM | DIASTOLIC BLOOD PRESSURE: 98 MMHG | RESPIRATION RATE: 16 BRPM | SYSTOLIC BLOOD PRESSURE: 150 MMHG | WEIGHT: 192.4 LBS | BODY MASS INDEX: 27.54 KG/M2

## 2021-05-24 DIAGNOSIS — E83.119 HEMOCHROMATOSIS, UNSPECIFIED HEMOCHROMATOSIS TYPE: ICD-10-CM

## 2021-05-24 DIAGNOSIS — Z12.5 SCREENING PSA (PROSTATE SPECIFIC ANTIGEN): ICD-10-CM

## 2021-05-24 DIAGNOSIS — Z00.00 WELLNESS EXAMINATION: ICD-10-CM

## 2021-05-24 DIAGNOSIS — R03.0 ELEVATED BLOOD PRESSURE READING: ICD-10-CM

## 2021-05-24 DIAGNOSIS — M10.9 GOUT, ARTHROPATHY: ICD-10-CM

## 2021-05-24 DIAGNOSIS — E78.49 OTHER HYPERLIPIDEMIA: ICD-10-CM

## 2021-05-24 DIAGNOSIS — G89.29 CHRONIC LOW BACK PAIN, UNSPECIFIED BACK PAIN LATERALITY, UNSPECIFIED WHETHER SCIATICA PRESENT: ICD-10-CM

## 2021-05-24 DIAGNOSIS — E79.0 HYPERURICEMIA: ICD-10-CM

## 2021-05-24 DIAGNOSIS — M54.50 CHRONIC LOW BACK PAIN, UNSPECIFIED BACK PAIN LATERALITY, UNSPECIFIED WHETHER SCIATICA PRESENT: ICD-10-CM

## 2021-05-24 PROCEDURE — 99214 OFFICE O/P EST MOD 30 MIN: CPT | Performed by: PHYSICIAN ASSISTANT

## 2021-05-24 ASSESSMENT — PATIENT HEALTH QUESTIONNAIRE - PHQ9: CLINICAL INTERPRETATION OF PHQ2 SCORE: 0

## 2021-05-24 NOTE — ASSESSMENT & PLAN NOTE
Lumbar spine pain that is recurrent starting in January. Patient suspects this is related to an old surgery.  Had a disc replacement L5-S1 Jan 21, 2006  Dr. Cook  Last Wednesday, normal workout then pain  4-5 weeks ago went out after carrying 50 pound box  January X 3 months  No sciatic pain. Hurts just in the spine. Laying down is ok. Yesterday standing was hurting. Sitting is ok.   No fever/chills, no swelling/redness or rash, no bladder/bowel incontinence, no saddle anesthesia

## 2021-05-24 NOTE — ASSESSMENT & PLAN NOTE
Per patient this was normal at the last phlebotomy appointment, will keep record at home then follow up

## 2021-05-26 NOTE — PROGRESS NOTES
Subjective:   Charlie Avila is a 61 y.o. male here today for f/u on chronic conditions.    Chronic low back pain  Lumbar spine pain that is recurrent starting in January. Patient suspects this is related to an old surgery.  Had a disc replacement L5-S1 Jan 21, 2006  Dr. Cook  Last Wednesday, normal workout then pain  4-5 weeks ago went out after carrying 50 pound box  January X 3 months  No sciatic pain. Hurts just in the spine. Laying down is ok. Yesterday standing was hurting. Sitting is ok.   No fever/chills, no swelling/redness or rash, no bladder/bowel incontinence, no saddle anesthesia    Hemochromatosis  Still doing phlebotomy q 4 months    Elevated blood pressure reading  Per patient this was normal at the last phlebotomy appointment, will keep record at home then follow up    Gout, arthropathy  Chronic, stable on current allopurinol, due for labs    Hyperlipidemia  Chronic,stable on current, due for labs       Current medicines (including changes today)  Current Outpatient Medications   Medication Sig Dispense Refill   • valacyclovir (VALTREX) 1 GM Tab TAKE 2 TABLETS BY MOUTH EVERY 12 HOURS FOR 1 DAY AS NEEDED FOR OUTBREAKS. 12 tablet 0   • allopurinol (ZYLOPRIM) 300 MG Tab TAKE ONE TABLET BY MOUTH ONE TIME DAILY 90 Tab 3   • lovastatin (MEVACOR) 20 MG Tab Take 1 tablet by mouth once daily 90 Tab 3   • naproxen (NAPROSYN) 375 MG Tab Take 1 Tab by mouth 2 times a day, with meals. 60 Tab 11   • ASPIRIN 81 MG tablet Take  by mouth every day.       No current facility-administered medications for this visit.     He  has a past medical history of Fatty liver (4/30/2013), HEMOCHROMATOSIS (4/26/2010), HSV-1 (herpes simplex virus 1) infection, and Hyperlipidemia (4/26/2010).    ROS   No fever/chills. No weight change. No headache/dizziness. No focal weakness. No sore throat, nasal congestion, ear pain. No chest pain, no shortness of breath, difficulty breathing. No n/v/d/c or abdominal pain. No urinary  "complaint. No rash or skin lesion. No joint redness or swelling.       Objective:     /98 (BP Location: Left arm, Patient Position: Sitting, BP Cuff Size: Adult long)   Pulse 63   Temp 36.8 °C (98.2 °F) (Temporal)   Resp 16   Ht 1.778 m (5' 10\")   Wt 87.3 kg (192 lb 6.4 oz)   SpO2 97%  Body mass index is 27.61 kg/m².   Physical Exam:  Constitutional: WDWN, NAD  Skin: Warm, dry, good turgor, no rashes in visible areas.  Respiratory: Unlabored respiratory effort, lungs clear to auscultation, no wheezes, no ronchi.  Cardiovascular: Normal S1, S2, no murmur, no leg edema.  Psych: Alert and oriented x3, normal affect and mood.    Assessment and Plan:   The following treatment plan was discussed    1. Chronic low back pain, unspecified back pain laterality, unspecified whether sciatica present    - REFERRAL TO NEUROSURGERY    2. Elevated blood pressure reading      3. Hemochromatosis, unspecified hemochromatosis type    - CBC WITH DIFFERENTIAL; Future    4. Hyperuricemia    - URIC ACID; Future    5. Screening PSA (prostate specific antigen)    - PROSTATE SPECIFIC AG SCREENING; Future    6. Wellness examination    - Comp Metabolic Panel; Future    7. Other hyperlipidemia    - Lipid Profile; Future    8. Gout, arthropathy        Followup: after labs, yearly and as needed         "

## 2021-06-03 ENCOUNTER — PATIENT MESSAGE (OUTPATIENT)
Dept: MEDICAL GROUP | Facility: MEDICAL CENTER | Age: 61
End: 2021-06-03

## 2021-06-03 ENCOUNTER — TELEPHONE (OUTPATIENT)
Dept: MEDICAL GROUP | Facility: MEDICAL CENTER | Age: 61
End: 2021-06-03

## 2021-06-03 DIAGNOSIS — G89.29 CHRONIC MIDLINE LOW BACK PAIN WITHOUT SCIATICA: ICD-10-CM

## 2021-06-03 DIAGNOSIS — Z98.890 HISTORY OF LUMBAR SURGERY: ICD-10-CM

## 2021-06-03 DIAGNOSIS — M54.50 CHRONIC MIDLINE LOW BACK PAIN WITHOUT SCIATICA: ICD-10-CM

## 2021-06-03 NOTE — TELEPHONE ENCOUNTER
Phone Number Called: [phone] (home) [phone] (work)    Call outcome: Spoke to patient regarding message below.    Message: Spoke to pt and sent the message to him via [email or web address] because pt requested it due to him driving

## 2021-06-03 NOTE — TELEPHONE ENCOUNTER
Please advise patient that I did order MRI and he can call [phone] to schedule or he may need to go to [facility] if [facility] isn't taking [facility] anymore. If he goes to [facility] I think he will have to come and  the order. His insurance may require he have an xray first which I have also ordered. Thanks! Felisha Callahan PA-C

## 2021-06-03 NOTE — TELEPHONE ENCOUNTER
Pt called saying the neurosurgery place called him yesterday needing an MRI. Pt would like to know how he would go about with this because his insurance is [employer]. Please advise, thank you

## 2021-07-24 DIAGNOSIS — E78.49 OTHER HYPERLIPIDEMIA: ICD-10-CM

## 2021-07-26 RX ORDER — LOVASTATIN 20 MG/1
TABLET ORAL
Qty: 90 TABLET | Refills: 3 | Status: SHIPPED | OUTPATIENT
Start: 2021-07-26 | End: 2022-07-14 | Stop reason: SDUPTHER

## 2021-08-24 DIAGNOSIS — E79.0 HYPERURICEMIA: ICD-10-CM

## 2021-08-24 RX ORDER — ALLOPURINOL 300 MG/1
TABLET ORAL
Qty: 90 TABLET | Refills: 3 | Status: SHIPPED | OUTPATIENT
Start: 2021-08-24 | End: 2022-09-09 | Stop reason: SDUPTHER

## 2021-09-16 DIAGNOSIS — M25.50 ARTHRALGIA, UNSPECIFIED JOINT: ICD-10-CM

## 2021-09-17 RX ORDER — NAPROXEN 375 MG/1
TABLET ORAL
Qty: 60 TABLET | Refills: 1 | Status: SHIPPED | OUTPATIENT
Start: 2021-09-17 | End: 2022-01-14

## 2022-01-07 DIAGNOSIS — B00.9 HSV INFECTION: ICD-10-CM

## 2022-01-07 RX ORDER — VALACYCLOVIR HYDROCHLORIDE 1 G/1
TABLET, FILM COATED ORAL
Qty: 12 TABLET | Refills: 3 | Status: SHIPPED | OUTPATIENT
Start: 2022-01-07

## 2022-01-14 DIAGNOSIS — M25.50 ARTHRALGIA, UNSPECIFIED JOINT: ICD-10-CM

## 2022-01-14 RX ORDER — NAPROXEN 375 MG/1
TABLET ORAL
Qty: 60 TABLET | Refills: 3 | Status: SHIPPED | OUTPATIENT
Start: 2022-01-14 | End: 2022-09-27 | Stop reason: SDUPTHER

## 2022-03-01 ENCOUNTER — OFFICE VISIT (OUTPATIENT)
Dept: MEDICAL GROUP | Facility: MEDICAL CENTER | Age: 62
End: 2022-03-01
Payer: COMMERCIAL

## 2022-03-01 VITALS
SYSTOLIC BLOOD PRESSURE: 188 MMHG | WEIGHT: 195.8 LBS | BODY MASS INDEX: 28.03 KG/M2 | DIASTOLIC BLOOD PRESSURE: 100 MMHG | TEMPERATURE: 96.9 F | HEIGHT: 70 IN | OXYGEN SATURATION: 96 % | HEART RATE: 64 BPM

## 2022-03-01 DIAGNOSIS — I10 PRIMARY HYPERTENSION: ICD-10-CM

## 2022-03-01 DIAGNOSIS — Z00.00 WELLNESS EXAMINATION: ICD-10-CM

## 2022-03-01 PROCEDURE — 99214 OFFICE O/P EST MOD 30 MIN: CPT | Performed by: PHYSICIAN ASSISTANT

## 2022-03-01 RX ORDER — LISINOPRIL 10 MG/1
10 TABLET ORAL DAILY
Qty: 30 TABLET | Refills: 1 | Status: SHIPPED | OUTPATIENT
Start: 2022-03-01 | End: 2022-03-15

## 2022-03-01 ASSESSMENT — PATIENT HEALTH QUESTIONNAIRE - PHQ9: CLINICAL INTERPRETATION OF PHQ2 SCORE: 0

## 2022-03-02 NOTE — PROGRESS NOTES
"Subjective:   Charlie Avila is a 61 y.o. male here today for HTN    Chief Complaint   Patient presents with   • Hypertension     Running in the 200's x 2 months       Primary hypertension  Not well controlled, agrees to start medication, no headache, dizziness, chest pain, SOB, leg swelling. No h/o stroke or MI. Discussed low sodium diet. F/u 2 weeks. Has been taking naproxen nightly.       Current medicines (including changes today)  Current Outpatient Medications   Medication Sig Dispense Refill   • lisinopril (PRINIVIL) 10 MG Tab Take 1 Tablet by mouth every day. 30 Tablet 1   • naproxen (NAPROSYN) 375 MG Tab TAKE 1 TABLET BY MOUTH TWICE DAILY WITH MEALS 60 Tablet 3   • valacyclovir (VALTREX) 1 GM Tab TAKE 2 TABLETS BY MOUTH EVERY 12 HOURS FOR 1 DAY AS NEEDED FOR OUTBREAKS. 12 Tablet 3   • allopurinol (ZYLOPRIM) 300 MG Tab TAKE ONE TABLET BY MOUTH ONCE DAILY 90 Tablet 3   • lovastatin (MEVACOR) 20 MG Tab Take 1 tablet by mouth once daily 90 tablet 3     No current facility-administered medications for this visit.     He  has a past medical history of Fatty liver (4/30/2013), HEMOCHROMATOSIS (4/26/2010), HSV-1 (herpes simplex virus 1) infection, and Hyperlipidemia (4/26/2010).    ROS   No fever/chills. No weight change. No headache/dizziness. No focal weakness. No sore throat, nasal congestion, ear pain. No chest pain, no shortness of breath, difficulty breathing. No n/v/d/c or abdominal pain. No urinary complaint. No rash or skin lesion. No joint pain or swelling.       Objective:     BP (!) 188/100 (BP Location: Right arm, Patient Position: Sitting, BP Cuff Size: Adult long)   Pulse 64   Temp 36.1 °C (96.9 °F) (Temporal)   Ht 1.778 m (5' 10\")   Wt 88.8 kg (195 lb 12.8 oz)   SpO2 96%  Body mass index is 28.09 kg/m².   Physical Exam:  Constitutional: WDWN, NAD  Skin: Warm, dry, good turgor, no rashes in visible areas.  Eye: Equal, round and reactive, conjunctiva clear, lids normal.  ENMT: Lips without lesions, " good dentition, oropharynx clear.  Neck: Trachea midline, no masses, no thyromegaly. No cervical or supraclavicular lymphadenopathy  Respiratory: Unlabored respiratory effort, lungs clear to auscultation, no wheezes, no ronchi.  Cardiovascular: Normal S1, S2, no murmur, no leg edema.  Abdomen: Soft, non-tender, no masses, no hepatosplenomegaly.  Psych: Alert and oriented x3, normal affect and mood.        Assessment and Plan:   The following treatment plan was discussed    1. Wellness examination      2. Primary hypertension    - lisinopril (PRINIVIL) 10 MG Tab; Take 1 Tablet by mouth every day.  Dispense: 30 Tablet; Refill: 1  - Comp Metabolic Panel; Future  - CBC WITH DIFFERENTIAL; Future  - US-RENAL; Future      Followup: 2 weeks

## 2022-03-02 NOTE — ASSESSMENT & PLAN NOTE
Not well controlled, agrees to start medication, no headache, dizziness, chest pain, SOB, leg swelling. No h/o stroke or MI. Discussed low sodium diet. F/u 2 weeks. Has been taking naproxen nightly.

## 2022-03-03 LAB
ALBUMIN SERPL-MCNC: 4.9 G/DL (ref 3.8–4.8)
ALBUMIN/GLOB SERPL: 1.8 {RATIO} (ref 1.2–2.2)
ALP SERPL-CCNC: 52 IU/L (ref 44–121)
ALT SERPL-CCNC: 38 IU/L (ref 0–44)
AST SERPL-CCNC: 31 IU/L (ref 0–40)
BASOPHILS # BLD AUTO: 0 X10E3/UL (ref 0–0.2)
BASOPHILS NFR BLD AUTO: 1 %
BILIRUB SERPL-MCNC: 0.6 MG/DL (ref 0–1.2)
BUN SERPL-MCNC: 26 MG/DL (ref 8–27)
BUN/CREAT SERPL: 19 (ref 10–24)
CALCIUM SERPL-MCNC: 10.1 MG/DL (ref 8.6–10.2)
CHLORIDE SERPL-SCNC: 105 MMOL/L (ref 96–106)
CO2 SERPL-SCNC: 22 MMOL/L (ref 20–29)
CREAT SERPL-MCNC: 1.36 MG/DL (ref 0.76–1.27)
EGFRCR SERPLBLD CKD-EPI 2021: 58 ML/MIN/1.73
EOSINOPHIL # BLD AUTO: 0.1 X10E3/UL (ref 0–0.4)
EOSINOPHIL NFR BLD AUTO: 1 %
ERYTHROCYTE [DISTWIDTH] IN BLOOD BY AUTOMATED COUNT: 14.9 % (ref 11.6–15.4)
GLOBULIN SER CALC-MCNC: 2.7 G/DL (ref 1.5–4.5)
GLUCOSE SERPL-MCNC: 94 MG/DL (ref 65–99)
HCT VFR BLD AUTO: 49.6 % (ref 37.5–51)
HGB BLD-MCNC: 16 G/DL (ref 13–17.7)
IMM GRANULOCYTES # BLD AUTO: 0 X10E3/UL (ref 0–0.1)
IMM GRANULOCYTES NFR BLD AUTO: 0 %
IMMATURE CELLS  115398: NORMAL
LYMPHOCYTES # BLD AUTO: 1.9 X10E3/UL (ref 0.7–3.1)
LYMPHOCYTES NFR BLD AUTO: 24 %
MCH RBC QN AUTO: 28 PG (ref 26.6–33)
MCHC RBC AUTO-ENTMCNC: 32.3 G/DL (ref 31.5–35.7)
MCV RBC AUTO: 87 FL (ref 79–97)
MONOCYTES # BLD AUTO: 0.6 X10E3/UL (ref 0.1–0.9)
MONOCYTES NFR BLD AUTO: 7 %
MORPHOLOGY BLD-IMP: NORMAL
NEUTROPHILS # BLD AUTO: 5.5 X10E3/UL (ref 1.4–7)
NEUTROPHILS NFR BLD AUTO: 67 %
NRBC BLD AUTO-RTO: NORMAL %
PLATELET # BLD AUTO: 208 X10E3/UL (ref 150–450)
POTASSIUM SERPL-SCNC: 5.4 MMOL/L (ref 3.5–5.2)
PROT SERPL-MCNC: 7.6 G/DL (ref 6–8.5)
RBC # BLD AUTO: 5.71 X10E6/UL (ref 4.14–5.8)
SODIUM SERPL-SCNC: 141 MMOL/L (ref 134–144)
WBC # BLD AUTO: 8.1 X10E3/UL (ref 3.4–10.8)

## 2022-03-04 ENCOUNTER — APPOINTMENT (OUTPATIENT)
Dept: RADIOLOGY | Facility: MEDICAL CENTER | Age: 62
End: 2022-03-04
Attending: PHYSICIAN ASSISTANT

## 2022-03-15 ENCOUNTER — OFFICE VISIT (OUTPATIENT)
Dept: MEDICAL GROUP | Facility: MEDICAL CENTER | Age: 62
End: 2022-03-15
Payer: COMMERCIAL

## 2022-03-15 VITALS
BODY MASS INDEX: 28.46 KG/M2 | OXYGEN SATURATION: 97 % | HEIGHT: 70 IN | SYSTOLIC BLOOD PRESSURE: 138 MMHG | WEIGHT: 198.8 LBS | DIASTOLIC BLOOD PRESSURE: 110 MMHG | TEMPERATURE: 96.5 F | HEART RATE: 64 BPM

## 2022-03-15 DIAGNOSIS — E87.5 HYPERKALEMIA: ICD-10-CM

## 2022-03-15 DIAGNOSIS — I10 PRIMARY HYPERTENSION: ICD-10-CM

## 2022-03-15 DIAGNOSIS — N18.31 STAGE 3A CHRONIC KIDNEY DISEASE: ICD-10-CM

## 2022-03-15 PROCEDURE — 99214 OFFICE O/P EST MOD 30 MIN: CPT | Performed by: PHYSICIAN ASSISTANT

## 2022-03-15 RX ORDER — AMLODIPINE BESYLATE 10 MG/1
10 TABLET ORAL DAILY
Qty: 30 TABLET | Refills: 0 | Status: SHIPPED | OUTPATIENT
Start: 2022-03-15 | End: 2022-03-29 | Stop reason: SDUPTHER

## 2022-03-15 ASSESSMENT — FIBROSIS 4 INDEX: FIB4 SCORE: 1.52

## 2022-03-15 NOTE — PATIENT INSTRUCTIONS
The ability to maintain potassium excretion at near-normal levels is generally maintained in patients with kidney disease as long as both aldosterone secretion and distal flow are maintainedThus, hyperkalemia generally develops in the patient who is oliguric or who has an additional problem such as a high-potassium diet, increased tissue breakdown, or hypoaldosteronism. Impaired cell uptake of potassium also may contribute to the development of hyperkalemia in advanced CKD.Hyperkalemia due to ACE inhibitor or ARB therapy is most likely to occur in patients in whom the serum potassium concentration is elevated or in the high-normal range prior to therapy. This is discussed in detail separately. . Nonselective beta blockers may result in a postprandial rise in the serum potassium concentration but do not cause persistent hyperkalemia.

## 2022-03-16 PROBLEM — E87.5 HYPERKALEMIA: Status: ACTIVE | Noted: 2022-03-16

## 2022-03-16 NOTE — PROGRESS NOTES
"Subjective:   Charlie Avila is a 61 y.o. male here today for follow up on HTN, lab review    Chief Complaint   Patient presents with   • Lab Follow-up   • Medication Management     Lisinopril       Primary hypertension  Improved on current, concern with elevated potassium, will switch from losartan to amlodipine    Stage 3a chronic kidney disease (HCC)  Patient declines nephrology or imaging at this point, feels that he may have been dehydrated, will retest and follow up    Hyperkalemia  Potassium 5.4       Current medicines (including changes today)  Current Outpatient Medications   Medication Sig Dispense Refill   • amLODIPine (NORVASC) 10 MG Tab Take 1 Tablet by mouth every day. 30 Tablet 0   • naproxen (NAPROSYN) 375 MG Tab TAKE 1 TABLET BY MOUTH TWICE DAILY WITH MEALS 60 Tablet 3   • valacyclovir (VALTREX) 1 GM Tab TAKE 2 TABLETS BY MOUTH EVERY 12 HOURS FOR 1 DAY AS NEEDED FOR OUTBREAKS. 12 Tablet 3   • allopurinol (ZYLOPRIM) 300 MG Tab TAKE ONE TABLET BY MOUTH ONCE DAILY 90 Tablet 3   • lovastatin (MEVACOR) 20 MG Tab Take 1 tablet by mouth once daily 90 tablet 3     No current facility-administered medications for this visit.     He  has a past medical history of Fatty liver (4/30/2013), HEMOCHROMATOSIS (4/26/2010), HSV-1 (herpes simplex virus 1) infection, and Hyperlipidemia (4/26/2010).    ROS   No fever/chills. No weight change. No headache/dizziness. No focal weakness. No sore throat, nasal congestion, ear pain. No chest pain, no shortness of breath, difficulty breathing. No n/v/d/c or abdominal pain. No urinary complaint. No rash or skin lesion. No joint pain or swelling.       Objective:     /110 (BP Location: Left arm, Patient Position: Sitting, BP Cuff Size: Adult long)   Pulse 64   Temp 35.8 °C (96.5 °F) (Temporal)   Ht 1.778 m (5' 10\")   Wt 90.2 kg (198 lb 12.8 oz)   SpO2 97%  Body mass index is 28.52 kg/m².   Physical Exam:  Constitutional: WDWN, NAD  Skin: Warm, dry, good turgor, no " rashes in visible areas.  Respiratory: Unlabored respiratory effort, lungs clear to auscultation, no wheezes, no ronchi.  Cardiovascular: Normal S1, S2, no murmur, no leg edema.  Psych: Alert and oriented x3, normal affect and mood.    Assessment and Plan:   The following treatment plan was discussed    1. Primary hypertension    - amLODIPine (NORVASC) 10 MG Tab; Take 1 Tablet by mouth every day.  Dispense: 30 Tablet; Refill: 0  - Basic Metabolic Panel; Future    2. Stage 3a chronic kidney disease (HCC)      3. Hyperkalemia        Followup: 2 weeks

## 2022-03-16 NOTE — ASSESSMENT & PLAN NOTE
Patient declines nephrology or imaging at this point, feels that he may have been dehydrated, will retest and follow up

## 2022-03-26 LAB
BUN SERPL-MCNC: 19 MG/DL (ref 8–27)
BUN/CREAT SERPL: 16 (ref 10–24)
CALCIUM SERPL-MCNC: 9.7 MG/DL (ref 8.6–10.2)
CHLORIDE SERPL-SCNC: 104 MMOL/L (ref 96–106)
CO2 SERPL-SCNC: 23 MMOL/L (ref 20–29)
CREAT SERPL-MCNC: 1.19 MG/DL (ref 0.76–1.27)
EGFRCR SERPLBLD CKD-EPI 2021: 69 ML/MIN/1.73
GLUCOSE SERPL-MCNC: 101 MG/DL (ref 65–99)
POTASSIUM SERPL-SCNC: 4 MMOL/L (ref 3.5–5.2)
SODIUM SERPL-SCNC: 142 MMOL/L (ref 134–144)

## 2022-03-29 ENCOUNTER — OFFICE VISIT (OUTPATIENT)
Dept: MEDICAL GROUP | Facility: MEDICAL CENTER | Age: 62
End: 2022-03-29
Payer: COMMERCIAL

## 2022-03-29 VITALS
WEIGHT: 197.2 LBS | BODY MASS INDEX: 28.23 KG/M2 | OXYGEN SATURATION: 98 % | SYSTOLIC BLOOD PRESSURE: 132 MMHG | HEART RATE: 76 BPM | DIASTOLIC BLOOD PRESSURE: 82 MMHG | TEMPERATURE: 97.4 F | HEIGHT: 70 IN

## 2022-03-29 DIAGNOSIS — I10 PRIMARY HYPERTENSION: ICD-10-CM

## 2022-03-29 PROBLEM — E87.5 HYPERKALEMIA: Status: RESOLVED | Noted: 2022-03-16 | Resolved: 2022-03-29

## 2022-03-29 PROCEDURE — 99213 OFFICE O/P EST LOW 20 MIN: CPT | Performed by: PHYSICIAN ASSISTANT

## 2022-03-29 RX ORDER — AMLODIPINE BESYLATE 10 MG/1
10 TABLET ORAL DAILY
Qty: 90 TABLET | Refills: 3 | Status: SHIPPED | OUTPATIENT
Start: 2022-03-29 | End: 2023-03-31 | Stop reason: SDUPTHER

## 2022-03-29 ASSESSMENT — FIBROSIS 4 INDEX: FIB4 SCORE: 1.52

## 2022-03-29 NOTE — ASSESSMENT & PLAN NOTE
Improved on current, will continue current, feeling a little more tired/weakness at the gym but willing to continue meds and hopefully his body will adjust. Kidney function improved on most recent labs

## 2022-03-29 NOTE — PROGRESS NOTES
"Subjective:   Charlie Avila is a 61 y.o. male here today for follow up on HTN    Chief Complaint   Patient presents with   • Hypertension     Follow up       Primary hypertension  Improved on current, will continue current, feeling a little more tired/weakness at the gym but willing to continue meds and hopefully his body will adjust. Kidney function improved on most recent labs     Current medicines (including changes today)  Current Outpatient Medications   Medication Sig Dispense Refill   • amLODIPine (NORVASC) 10 MG Tab Take 1 Tablet by mouth every day. 90 Tablet 3   • naproxen (NAPROSYN) 375 MG Tab TAKE 1 TABLET BY MOUTH TWICE DAILY WITH MEALS 60 Tablet 3   • valacyclovir (VALTREX) 1 GM Tab TAKE 2 TABLETS BY MOUTH EVERY 12 HOURS FOR 1 DAY AS NEEDED FOR OUTBREAKS. 12 Tablet 3   • allopurinol (ZYLOPRIM) 300 MG Tab TAKE ONE TABLET BY MOUTH ONCE DAILY 90 Tablet 3   • lovastatin (MEVACOR) 20 MG Tab Take 1 tablet by mouth once daily 90 tablet 3     No current facility-administered medications for this visit.     He  has a past medical history of Fatty liver (4/30/2013), HEMOCHROMATOSIS (4/26/2010), HSV-1 (herpes simplex virus 1) infection, and Hyperlipidemia (4/26/2010).    ROS   No fever/chills. No weight change. No headache/dizziness. No focal weakness. No sore throat, nasal congestion, ear pain. No chest pain, no shortness of breath, difficulty breathing. No n/v/d/c or abdominal pain. No urinary complaint. No rash or skin lesion. No joint pain or swelling.     Objective:     /82 (BP Location: Left arm, Patient Position: Sitting, BP Cuff Size: Adult long)   Pulse 76   Temp 36.3 °C (97.4 °F) (Temporal)   Ht 1.778 m (5' 10\")   Wt 89.4 kg (197 lb 3.2 oz)   SpO2 98%  Body mass index is 28.3 kg/m².   Physical Exam:  Constitutional: WDWN, NAD  Skin: Warm, dry, good turgor, no rashes in visible areas.  Psych: Alert and oriented x3, normal affect and mood.    Assessment and Plan:   The following treatment plan " was discussed    1. Primary hypertension    - amLODIPine (NORVASC) 10 MG Tab; Take 1 Tablet by mouth every day.  Dispense: 90 Tablet; Refill: 3      Followup: 3 months

## 2022-07-14 DIAGNOSIS — E78.49 OTHER HYPERLIPIDEMIA: ICD-10-CM

## 2022-07-14 RX ORDER — LOVASTATIN 20 MG/1
20 TABLET ORAL DAILY
Qty: 90 TABLET | Refills: 3 | Status: SHIPPED | OUTPATIENT
Start: 2022-07-14 | End: 2023-03-31 | Stop reason: SDUPTHER

## 2022-08-01 SDOH — ECONOMIC STABILITY: HOUSING INSECURITY: IN THE LAST 12 MONTHS, HOW MANY PLACES HAVE YOU LIVED?: 1

## 2022-08-01 SDOH — ECONOMIC STABILITY: HOUSING INSECURITY
IN THE LAST 12 MONTHS, WAS THERE A TIME WHEN YOU DID NOT HAVE A STEADY PLACE TO SLEEP OR SLEPT IN A SHELTER (INCLUDING NOW)?: NO

## 2022-08-01 SDOH — HEALTH STABILITY: PHYSICAL HEALTH: ON AVERAGE, HOW MANY MINUTES DO YOU ENGAGE IN EXERCISE AT THIS LEVEL?: 120 MIN

## 2022-08-01 SDOH — ECONOMIC STABILITY: TRANSPORTATION INSECURITY
IN THE PAST 12 MONTHS, HAS THE LACK OF TRANSPORTATION KEPT YOU FROM MEDICAL APPOINTMENTS OR FROM GETTING MEDICATIONS?: NO

## 2022-08-01 SDOH — ECONOMIC STABILITY: TRANSPORTATION INSECURITY
IN THE PAST 12 MONTHS, HAS LACK OF TRANSPORTATION KEPT YOU FROM MEETINGS, WORK, OR FROM GETTING THINGS NEEDED FOR DAILY LIVING?: NO

## 2022-08-01 SDOH — HEALTH STABILITY: PHYSICAL HEALTH: ON AVERAGE, HOW MANY DAYS PER WEEK DO YOU ENGAGE IN MODERATE TO STRENUOUS EXERCISE (LIKE A BRISK WALK)?: 7 DAYS

## 2022-08-01 SDOH — ECONOMIC STABILITY: TRANSPORTATION INSECURITY
IN THE PAST 12 MONTHS, HAS LACK OF RELIABLE TRANSPORTATION KEPT YOU FROM MEDICAL APPOINTMENTS, MEETINGS, WORK OR FROM GETTING THINGS NEEDED FOR DAILY LIVING?: NO

## 2022-08-01 SDOH — ECONOMIC STABILITY: INCOME INSECURITY: HOW HARD IS IT FOR YOU TO PAY FOR THE VERY BASICS LIKE FOOD, HOUSING, MEDICAL CARE, AND HEATING?: NOT HARD AT ALL

## 2022-08-01 SDOH — ECONOMIC STABILITY: FOOD INSECURITY: WITHIN THE PAST 12 MONTHS, THE FOOD YOU BOUGHT JUST DIDN'T LAST AND YOU DIDN'T HAVE MONEY TO GET MORE.: NEVER TRUE

## 2022-08-01 SDOH — ECONOMIC STABILITY: FOOD INSECURITY: WITHIN THE PAST 12 MONTHS, YOU WORRIED THAT YOUR FOOD WOULD RUN OUT BEFORE YOU GOT MONEY TO BUY MORE.: NEVER TRUE

## 2022-08-01 SDOH — ECONOMIC STABILITY: INCOME INSECURITY: IN THE LAST 12 MONTHS, WAS THERE A TIME WHEN YOU WERE NOT ABLE TO PAY THE MORTGAGE OR RENT ON TIME?: NO

## 2022-08-01 SDOH — HEALTH STABILITY: MENTAL HEALTH
STRESS IS WHEN SOMEONE FEELS TENSE, NERVOUS, ANXIOUS, OR CAN'T SLEEP AT NIGHT BECAUSE THEIR MIND IS TROUBLED. HOW STRESSED ARE YOU?: NOT AT ALL

## 2022-08-01 ASSESSMENT — SOCIAL DETERMINANTS OF HEALTH (SDOH)
HOW OFTEN DO YOU ATTEND CHURCH OR RELIGIOUS SERVICES?: NEVER
DO YOU BELONG TO ANY CLUBS OR ORGANIZATIONS SUCH AS CHURCH GROUPS UNIONS, FRATERNAL OR ATHLETIC GROUPS, OR SCHOOL GROUPS?: NO
HOW OFTEN DO YOU HAVE A DRINK CONTAINING ALCOHOL: 2-4 TIMES A MONTH
HOW OFTEN DO YOU ATTENT MEETINGS OF THE CLUB OR ORGANIZATION YOU BELONG TO?: NEVER
WITHIN THE PAST 12 MONTHS, YOU WORRIED THAT YOUR FOOD WOULD RUN OUT BEFORE YOU GOT THE MONEY TO BUY MORE: NEVER TRUE
IN A TYPICAL WEEK, HOW MANY TIMES DO YOU TALK ON THE PHONE WITH FAMILY, FRIENDS, OR NEIGHBORS?: MORE THAN THREE TIMES A WEEK
HOW OFTEN DO YOU GET TOGETHER WITH FRIENDS OR RELATIVES?: ONCE A WEEK
DO YOU BELONG TO ANY CLUBS OR ORGANIZATIONS SUCH AS CHURCH GROUPS UNIONS, FRATERNAL OR ATHLETIC GROUPS, OR SCHOOL GROUPS?: NO
HOW HARD IS IT FOR YOU TO PAY FOR THE VERY BASICS LIKE FOOD, HOUSING, MEDICAL CARE, AND HEATING?: NOT HARD AT ALL
IN A TYPICAL WEEK, HOW MANY TIMES DO YOU TALK ON THE PHONE WITH FAMILY, FRIENDS, OR NEIGHBORS?: MORE THAN THREE TIMES A WEEK
HOW OFTEN DO YOU HAVE SIX OR MORE DRINKS ON ONE OCCASION: NEVER
HOW OFTEN DO YOU ATTEND CHURCH OR RELIGIOUS SERVICES?: NEVER
HOW MANY DRINKS CONTAINING ALCOHOL DO YOU HAVE ON A TYPICAL DAY WHEN YOU ARE DRINKING: 1 OR 2
HOW OFTEN DO YOU ATTENT MEETINGS OF THE CLUB OR ORGANIZATION YOU BELONG TO?: NEVER
HOW OFTEN DO YOU GET TOGETHER WITH FRIENDS OR RELATIVES?: ONCE A WEEK

## 2022-08-01 ASSESSMENT — LIFESTYLE VARIABLES
AUDIT-C TOTAL SCORE: 2
HOW OFTEN DO YOU HAVE SIX OR MORE DRINKS ON ONE OCCASION: NEVER
HOW OFTEN DO YOU HAVE A DRINK CONTAINING ALCOHOL: 2-4 TIMES A MONTH
SKIP TO QUESTIONS 9-10: 1
HOW MANY STANDARD DRINKS CONTAINING ALCOHOL DO YOU HAVE ON A TYPICAL DAY: 1 OR 2

## 2022-08-03 ENCOUNTER — OFFICE VISIT (OUTPATIENT)
Dept: MEDICAL GROUP | Facility: MEDICAL CENTER | Age: 62
End: 2022-08-03
Payer: COMMERCIAL

## 2022-08-03 VITALS
SYSTOLIC BLOOD PRESSURE: 130 MMHG | TEMPERATURE: 97.5 F | OXYGEN SATURATION: 99 % | HEART RATE: 67 BPM | WEIGHT: 193.4 LBS | DIASTOLIC BLOOD PRESSURE: 78 MMHG | RESPIRATION RATE: 16 BRPM | HEIGHT: 70 IN | BODY MASS INDEX: 27.69 KG/M2

## 2022-08-03 DIAGNOSIS — I10 PRIMARY HYPERTENSION: ICD-10-CM

## 2022-08-03 DIAGNOSIS — Z12.11 SCREENING FOR COLORECTAL CANCER: ICD-10-CM

## 2022-08-03 DIAGNOSIS — Z12.12 SCREENING FOR COLORECTAL CANCER: ICD-10-CM

## 2022-08-03 DIAGNOSIS — E83.119 HEMOCHROMATOSIS, UNSPECIFIED HEMOCHROMATOSIS TYPE: ICD-10-CM

## 2022-08-03 DIAGNOSIS — Z00.00 PREVENTATIVE HEALTH CARE: ICD-10-CM

## 2022-08-03 DIAGNOSIS — M10.9 GOUT, ARTHROPATHY: ICD-10-CM

## 2022-08-03 DIAGNOSIS — Z12.5 SCREENING FOR MALIGNANT NEOPLASM OF PROSTATE: ICD-10-CM

## 2022-08-03 DIAGNOSIS — E78.49 OTHER HYPERLIPIDEMIA: ICD-10-CM

## 2022-08-03 PROCEDURE — 99214 OFFICE O/P EST MOD 30 MIN: CPT | Performed by: PHYSICIAN ASSISTANT

## 2022-08-03 ASSESSMENT — FIBROSIS 4 INDEX: FIB4 SCORE: 1.55

## 2022-08-03 NOTE — PROGRESS NOTES
"Chief Complaint   Patient presents with   • Establish Care       HPI  Charlie Avila is a 62 [facility]. [facility]    Patient is currently taking allopurinol for elevated uric acid level was a started 15 years ago.  States he never had a gout attack however 15 years ago Monday she woke up with severe foot swelling and pain, they checked his uric acid and it was elevated however the foot swelling did not look like a gout attack.  He has been taking allopurinol ever since and uric acid level has been very well controlled.  He has had additional episodes, gets occasional swelling over the foot, switches between left and right, resolves with a steroid.    Pt has known HTN, controlled with current medicines. He denies HA, blurred vision, dizziness, shortness of breath, palpitations, or chest pain, lower extremity edema.      Patient has hemochromatosis, does phlebotomy double 3 times a year, does not require order for that.        Exam:  /78 (BP Location: Left arm, Patient Position: Sitting)   Pulse 67   Temp 36.4 °C (97.5 °F) (Temporal)   Resp 16   Ht 1.778 m (5' 10\")   Wt 87.7 kg (193 lb 6.4 oz)   SpO2 99%       Constitutional: Alert, oriented in no acute distress.  Psych: Eye contact is good, speech goal directed, affect calm  Eyes: Conjunctiva non-injected, sclera non-icteric.  Lungs: Unlabored respiratory effort, clear to auscultation bilaterally with good excursion, no wheez or rhonci  CV: regular rate and rhythm. No lower extremity edema  Ears:  External ears unremarkable. TMs pearly gray, clear and intact, w/o any perforation or effusion.        A/P:  1. Screening for colorectal cancer    - COLOGUARD (FIT DNA)    2. Hemochromatosis, unspecified hemochromatosis type      3. Gout, arthropathy  - URIC ACID, SERUM    4. Screening for malignant neoplasm of prostate    - PROSTATE SPECIFIC AG SCREENING; Future    5. Preventative health care  - CBC WITH DIFFERENTIAL; Future  - Comp " Metabolic Panel; Future  - Lipid Profile; Future    6. Primary hypertension    - MICROALBUMIN CREAT RATIO URINE; Future    7.  Hyperlipidemia,   continues on lovastatin 20 daily    F/U: prn

## 2022-08-09 LAB
ALBUMIN SERPL-MCNC: 4.9 G/DL (ref 3.8–4.8)
ALBUMIN/CREAT UR: 5 MG/G CREAT (ref 0–29)
ALBUMIN/GLOB SERPL: 2.1 {RATIO} (ref 1.2–2.2)
ALP SERPL-CCNC: 54 IU/L (ref 44–121)
ALT SERPL-CCNC: 32 IU/L (ref 0–44)
AST SERPL-CCNC: 29 IU/L (ref 0–40)
BASOPHILS # BLD AUTO: 0.1 X10E3/UL (ref 0–0.2)
BASOPHILS NFR BLD AUTO: 1 %
BILIRUB SERPL-MCNC: 0.7 MG/DL (ref 0–1.2)
BUN SERPL-MCNC: 26 MG/DL (ref 8–27)
BUN/CREAT SERPL: 19 (ref 10–24)
CALCIUM SERPL-MCNC: 9.2 MG/DL (ref 8.6–10.2)
CHLORIDE SERPL-SCNC: 106 MMOL/L (ref 96–106)
CHOLEST SERPL-MCNC: 155 MG/DL (ref 100–199)
CO2 SERPL-SCNC: 21 MMOL/L (ref 20–29)
CREAT SERPL-MCNC: 1.36 MG/DL (ref 0.76–1.27)
CREAT UR-MCNC: 178.1 MG/DL
EGFRCR SERPLBLD CKD-EPI 2021: 58 ML/MIN/1.73
EOSINOPHIL # BLD AUTO: 0.1 X10E3/UL (ref 0–0.4)
EOSINOPHIL NFR BLD AUTO: 1 %
ERYTHROCYTE [DISTWIDTH] IN BLOOD BY AUTOMATED COUNT: 14.6 % (ref 11.6–15.4)
GLOBULIN SER CALC-MCNC: 2.3 G/DL (ref 1.5–4.5)
GLUCOSE SERPL-MCNC: 101 MG/DL (ref 65–99)
HCT VFR BLD AUTO: 46.1 % (ref 37.5–51)
HDLC SERPL-MCNC: 44 MG/DL
HGB BLD-MCNC: 14.2 G/DL (ref 13–17.7)
IMM GRANULOCYTES # BLD AUTO: 0 X10E3/UL (ref 0–0.1)
IMM GRANULOCYTES NFR BLD AUTO: 0 %
IMMATURE CELLS  115398: ABNORMAL
LABORATORY COMMENT REPORT: NORMAL
LDLC SERPL CALC-MCNC: 98 MG/DL (ref 0–99)
LYMPHOCYTES # BLD AUTO: 2 X10E3/UL (ref 0.7–3.1)
LYMPHOCYTES NFR BLD AUTO: 30 %
MCH RBC QN AUTO: 27.3 PG (ref 26.6–33)
MCHC RBC AUTO-ENTMCNC: 30.8 G/DL (ref 31.5–35.7)
MCV RBC AUTO: 89 FL (ref 79–97)
MICROALBUMIN UR-MCNC: 8.5 UG/ML
MONOCYTES # BLD AUTO: 0.5 X10E3/UL (ref 0.1–0.9)
MONOCYTES NFR BLD AUTO: 7 %
MORPHOLOGY BLD-IMP: ABNORMAL
NEUTROPHILS # BLD AUTO: 4.1 X10E3/UL (ref 1.4–7)
NEUTROPHILS NFR BLD AUTO: 61 %
NRBC BLD AUTO-RTO: ABNORMAL %
PLATELET # BLD AUTO: 198 X10E3/UL (ref 150–450)
POTASSIUM SERPL-SCNC: 4.9 MMOL/L (ref 3.5–5.2)
PROT SERPL-MCNC: 7.2 G/DL (ref 6–8.5)
PSA SERPL-MCNC: 1.7 NG/ML (ref 0–4)
RBC # BLD AUTO: 5.2 X10E6/UL (ref 4.14–5.8)
SODIUM SERPL-SCNC: 141 MMOL/L (ref 134–144)
TRIGL SERPL-MCNC: 63 MG/DL (ref 0–149)
URATE SERPL-MCNC: 4.8 MG/DL (ref 3.8–8.4)
VLDLC SERPL CALC-MCNC: 13 MG/DL (ref 5–40)
WBC # BLD AUTO: 6.8 X10E3/UL (ref 3.4–10.8)

## 2022-09-08 ENCOUNTER — PATIENT MESSAGE (OUTPATIENT)
Dept: MEDICAL GROUP | Facility: MEDICAL CENTER | Age: 62
End: 2022-09-08
Payer: COMMERCIAL

## 2022-09-08 DIAGNOSIS — E79.0 HYPERURICEMIA: ICD-10-CM

## 2022-09-09 NOTE — PATIENT COMMUNICATION
Received request via: Patient    Was the patient seen in the last year in this department? Yes    Does the patient have an active prescription (recently filled or refills available) for medication(s) requested? No    Future Appointments         Provider Department [facility]    2/23/2023 1:20 PM (Arrive by 1:05 PM) Yoli Mora P.A.-C. [facility]

## 2022-09-13 DIAGNOSIS — E79.0 HYPERURICEMIA: ICD-10-CM

## 2022-09-13 RX ORDER — ALLOPURINOL 300 MG/1
TABLET ORAL
Qty: 90 TABLET | Refills: 3 | Status: SHIPPED | OUTPATIENT
Start: 2022-09-13 | End: 2023-03-31 | Stop reason: SDUPTHER

## 2022-09-13 RX ORDER — ALLOPURINOL 300 MG/1
TABLET ORAL
Qty: 90 TABLET | Refills: 3 | Status: SHIPPED | OUTPATIENT
Start: 2022-09-13 | End: 2022-09-13 | Stop reason: SDUPTHER

## 2022-09-27 DIAGNOSIS — M25.50 ARTHRALGIA, UNSPECIFIED JOINT: ICD-10-CM

## 2022-09-28 RX ORDER — NAPROXEN 375 MG/1
375 TABLET ORAL 2 TIMES DAILY WITH MEALS
Qty: 60 TABLET | Refills: 0 | Status: SHIPPED | OUTPATIENT
Start: 2022-09-28 | End: 2022-11-29

## 2022-10-12 ENCOUNTER — PATIENT MESSAGE (OUTPATIENT)
Dept: HEALTH INFORMATION MANAGEMENT | Facility: OTHER | Age: 62
End: 2022-10-12

## 2022-11-28 DIAGNOSIS — M25.50 ARTHRALGIA, UNSPECIFIED JOINT: ICD-10-CM

## 2022-11-29 RX ORDER — NAPROXEN 375 MG/1
TABLET ORAL
Qty: 60 TABLET | Refills: 0 | Status: SHIPPED | OUTPATIENT
Start: 2022-11-29 | End: 2022-12-27

## 2022-12-27 DIAGNOSIS — M25.50 ARTHRALGIA, UNSPECIFIED JOINT: ICD-10-CM

## 2022-12-27 RX ORDER — NAPROXEN 375 MG/1
TABLET ORAL
Qty: 60 TABLET | Refills: 0 | Status: SHIPPED | OUTPATIENT
Start: 2022-12-27 | End: 2023-03-31 | Stop reason: SDUPTHER

## 2023-03-31 DIAGNOSIS — E78.49 OTHER HYPERLIPIDEMIA: ICD-10-CM

## 2023-03-31 DIAGNOSIS — I10 PRIMARY HYPERTENSION: ICD-10-CM

## 2023-03-31 DIAGNOSIS — M25.50 ARTHRALGIA, UNSPECIFIED JOINT: ICD-10-CM

## 2023-03-31 DIAGNOSIS — E79.0 HYPERURICEMIA: ICD-10-CM

## 2023-03-31 NOTE — TELEPHONE ENCOUNTER
Received request via: Patient    Was the patient seen in the last year in this department? Yes    Does the patient have an active prescription (recently filled or refills available) for medication(s) requested? No    Does the patient have FPC Plus and need 100 day supply (blood pressure, diabetes and cholesterol meds only)? Patient does not have SCP    Future Appointments         Provider Department [place]    4/12/2023 1:00 PM (Arrive by 12:45 PM) Yoli Mora P.A.-C. [facility] [facility]    4/13/2023 10:00 AM Schuyler Stafford P.A.-C. [facility]

## 2023-04-03 ENCOUNTER — PATIENT MESSAGE (OUTPATIENT)
Dept: MEDICAL GROUP | Facility: MEDICAL CENTER | Age: 63
End: 2023-04-03
Payer: MEDICARE

## 2023-04-03 DIAGNOSIS — I10 PRIMARY HYPERTENSION: ICD-10-CM

## 2023-04-03 NOTE — PATIENT COMMUNICATION
Received request via: Patient    Was the patient seen in the last year in this department? Yes    Does the patient have an active prescription (recently filled or refills available) for medication(s) requested? No    Does the patient have FCI Plus and need 100 day supply (blood pressure, diabetes and cholesterol meds only)? Patient does not have SCP    Future Appointments         Provider Department [place]    4/12/2023 1:00 PM (Arrive by 12:45 PM) Yoli Mora P.A.-C. [facility]    4/13/2023 10:00 AM Schuyler Stafford P.A.-C. [facility]

## 2023-04-04 RX ORDER — NAPROXEN 375 MG/1
375 TABLET ORAL 2 TIMES DAILY WITH MEALS
Qty: 60 TABLET | Refills: 1 | Status: SHIPPED | OUTPATIENT
Start: 2023-04-04 | End: 2023-06-06

## 2023-04-04 RX ORDER — AMLODIPINE BESYLATE 10 MG/1
10 TABLET ORAL DAILY
Qty: 90 TABLET | Refills: 1 | Status: SHIPPED | OUTPATIENT
Start: 2023-04-04 | End: 2023-06-30 | Stop reason: SDUPTHER

## 2023-04-04 RX ORDER — AMLODIPINE BESYLATE 10 MG/1
10 TABLET ORAL DAILY
Qty: 90 TABLET | Refills: 3 | Status: SHIPPED | OUTPATIENT
Start: 2023-04-04 | End: 2023-04-18

## 2023-04-04 RX ORDER — ALLOPURINOL 300 MG/1
TABLET ORAL
Qty: 90 TABLET | Refills: 1 | Status: SHIPPED | OUTPATIENT
Start: 2023-04-04 | End: 2024-03-18 | Stop reason: SDUPTHER

## 2023-04-04 RX ORDER — LOVASTATIN 20 MG/1
20 TABLET ORAL DAILY
Qty: 90 TABLET | Refills: 1 | Status: SHIPPED | OUTPATIENT
Start: 2023-04-04 | End: 2024-01-23 | Stop reason: SDUPTHER

## 2023-04-05 LAB
ALBUMIN SERPL-MCNC: 4.8 G/DL (ref 3.8–4.8)
ALBUMIN/GLOB SERPL: 2.1 {RATIO} (ref 1.2–2.2)
ALP SERPL-CCNC: 54 IU/L (ref 44–121)
ALT SERPL-CCNC: 28 IU/L (ref 0–44)
AST SERPL-CCNC: 25 IU/L (ref 0–40)
BILIRUB SERPL-MCNC: 0.6 MG/DL (ref 0–1.2)
BUN SERPL-MCNC: 22 MG/DL (ref 8–27)
BUN/CREAT SERPL: 17 (ref 10–24)
CALCIUM SERPL-MCNC: 9.5 MG/DL (ref 8.6–10.2)
CHLORIDE SERPL-SCNC: 107 MMOL/L (ref 96–106)
CO2 SERPL-SCNC: 19 MMOL/L (ref 20–29)
CREAT SERPL-MCNC: 1.29 MG/DL (ref 0.76–1.27)
EGFRCR SERPLBLD CKD-EPI 2021: 62 ML/MIN/1.73
GLOBULIN SER CALC-MCNC: 2.3 G/DL (ref 1.5–4.5)
GLUCOSE SERPL-MCNC: 87 MG/DL (ref 70–99)
POTASSIUM SERPL-SCNC: 4.8 MMOL/L (ref 3.5–5.2)
PROT SERPL-MCNC: 7.1 G/DL (ref 6–8.5)
SODIUM SERPL-SCNC: 138 MMOL/L (ref 134–144)

## 2023-04-12 ENCOUNTER — APPOINTMENT (OUTPATIENT)
Dept: MEDICAL GROUP | Facility: MEDICAL CENTER | Age: 63
End: 2023-04-12
Payer: MEDICARE

## 2023-04-18 ENCOUNTER — OFFICE VISIT (OUTPATIENT)
Dept: MEDICAL GROUP | Facility: MEDICAL CENTER | Age: 63
End: 2023-04-18
Payer: MEDICARE

## 2023-04-18 VITALS
HEIGHT: 70 IN | SYSTOLIC BLOOD PRESSURE: 158 MMHG | BODY MASS INDEX: 28.77 KG/M2 | WEIGHT: 201 LBS | HEART RATE: 64 BPM | OXYGEN SATURATION: 98 % | RESPIRATION RATE: 16 BRPM | TEMPERATURE: 96.9 F | DIASTOLIC BLOOD PRESSURE: 88 MMHG

## 2023-04-18 DIAGNOSIS — G89.29 CHRONIC LOW BACK PAIN, UNSPECIFIED BACK PAIN LATERALITY, UNSPECIFIED WHETHER SCIATICA PRESENT: ICD-10-CM

## 2023-04-18 DIAGNOSIS — M54.50 CHRONIC LOW BACK PAIN, UNSPECIFIED BACK PAIN LATERALITY, UNSPECIFIED WHETHER SCIATICA PRESENT: ICD-10-CM

## 2023-04-18 DIAGNOSIS — Z12.5 SCREENING FOR MALIGNANT NEOPLASM OF PROSTATE: ICD-10-CM

## 2023-04-18 DIAGNOSIS — I10 PRIMARY HYPERTENSION: ICD-10-CM

## 2023-04-18 DIAGNOSIS — Z11.59 NEED FOR HEPATITIS C SCREENING TEST: ICD-10-CM

## 2023-04-18 DIAGNOSIS — E78.49 OTHER HYPERLIPIDEMIA: ICD-10-CM

## 2023-04-18 PROCEDURE — 99214 OFFICE O/P EST MOD 30 MIN: CPT | Performed by: PHYSICIAN ASSISTANT

## 2023-04-18 ASSESSMENT — PATIENT HEALTH QUESTIONNAIRE - PHQ9: CLINICAL INTERPRETATION OF PHQ2 SCORE: 0

## 2023-04-18 ASSESSMENT — FIBROSIS 4 INDEX: FIB4 SCORE: 1.55

## 2023-04-18 NOTE — PROGRESS NOTES
"Chief Complaint   Patient presents with    Hypertension Follow-up       HPI  Charlie Avila is a 63 y.o. male here today for f/u on HTN    Patient blood pressure is elevated today in office 158/88.  States he checks BP at home and numbers are well controlled 120/80.  Denies any SOB or CP.      Patient has chronic lower back pain with an artificial disc.  Takes 1 Naprosyn every night to help him sleep at night which has been very helpful with pain and stiffness.  Last kidney function, GFR within normal limits.            Exam:  BP (!) 158/88 (BP Location: Left arm, Patient Position: Sitting)   Pulse 64   Temp 36.1 °C (96.9 °F) (Temporal)   Resp 16   Ht 1.778 m (5' 10\")   Wt 91.2 kg (201 lb)   SpO2 98%       Constitutional: Alert, oriented in no acute distress.  Psych: Eye contact is good, speech goal directed, affect calm  Eyes: Conjunctiva non-injected, sclera non-icteric.  Lungs: Unlabored respiratory effort, clear to auscultation bilaterally with good excursion, no wheez or rhonci  CV: regular rate and rhythm. No lower extremity edema        A/P:  1. Other hyperlipidemia  Continue lovastatin 20 mg daily  - Lipid Profile; Future    2. Chronic low back pain, unspecified back pain laterality, unspecified whether sciatica present    Patient states he has an artificial disc, takes 1 Naprosyn nightly to help with pain and stiffness    GFR> 60    3. Primary hypertension  Chronic, not controlled, continues on amlodipine 10 Mg daily.  Patient states his BP is well controlled at home.  Decided not to increase medicine at this point however if it continues to be elevated  We will add to his medicine.      - Comp Metabolic Panel; Future  - CBC WITH DIFFERENTIAL; Future  - MICROALBUMIN CREAT RATIO URINE; Future    4. Screening for malignant neoplasm of prostate    - PROSTATE SPECIFIC AG SCREENING; Future    5. Need for hepatitis C screening test    - HEP C VIRUS ANTIBODY; Future      Last lab results were reviewed by me " today and discussed w patient.      F/U: Follow-up in 3 months with blood pressure log and blood pressure cuff

## 2023-06-03 DIAGNOSIS — M25.50 ARTHRALGIA, UNSPECIFIED JOINT: ICD-10-CM

## 2023-06-05 NOTE — TELEPHONE ENCOUNTER
Future Appointments         Provider Department Center    7/19/2023 1:00 PM (Arrive by 12:45 PM) Yoli Mora P.A.-C. [facility]

## 2023-06-06 RX ORDER — NAPROXEN 375 MG/1
TABLET ORAL
Qty: 60 TABLET | Refills: 1 | Status: SHIPPED | OUTPATIENT
Start: 2023-06-06 | End: 2023-12-04 | Stop reason: SDUPTHER

## 2023-06-30 DIAGNOSIS — I10 PRIMARY HYPERTENSION: ICD-10-CM

## 2023-07-03 DIAGNOSIS — I10 PRIMARY HYPERTENSION: ICD-10-CM

## 2023-07-03 NOTE — TELEPHONE ENCOUNTER
Received request via: Pharmacy    Was the patient seen in the last year in this department? Yes    Does the patient have an active prescription (recently filled or refills available) for medication(s) requested? No    Does the patient have USP Plus and need 100 day supply (blood pressure, diabetes and cholesterol meds only)? Patient does not have SCP    Future Appointments         Provider Department [facility]    7/19/2023 1:00 PM (Arrive by 12:45 PM) Yoli Mora P.A.-C. [facility]

## 2023-07-05 DIAGNOSIS — I10 PRIMARY HYPERTENSION: ICD-10-CM

## 2023-07-05 RX ORDER — AMLODIPINE BESYLATE 10 MG/1
10 TABLET ORAL DAILY
Qty: 90 TABLET | Refills: 1 | OUTPATIENT
Start: 2023-07-05

## 2023-07-05 RX ORDER — AMLODIPINE BESYLATE 10 MG/1
10 TABLET ORAL DAILY
Qty: 90 TABLET | Refills: 1 | Status: SHIPPED | OUTPATIENT
Start: 2023-07-05 | End: 2023-12-26 | Stop reason: SDUPTHER

## 2023-07-05 RX ORDER — AMLODIPINE BESYLATE 10 MG/1
10 TABLET ORAL DAILY
Qty: 90 TABLET | Refills: 1 | Status: SHIPPED | OUTPATIENT
Start: 2023-07-05 | End: 2023-08-31

## 2023-07-05 NOTE — TELEPHONE ENCOUNTER
Received request via: Pharmacy    Was the patient seen in the last year in this department? Yes    Does the patient have an active prescription (recently filled or refills available) for medication(s) requested? No    Does the patient have jail Plus and need 100 day supply (blood pressure, diabetes and cholesterol meds only)? Patient does not have SCP    Future Appointments         Provider Department [facility]    7/19/2023 1:00 PM (Arrive by 12:45 PM) Yoli Mora P.A.-C. [facility]

## 2023-07-19 ENCOUNTER — OFFICE VISIT (OUTPATIENT)
Dept: MEDICAL GROUP | Facility: MEDICAL CENTER | Age: 63
End: 2023-07-19
Payer: MEDICARE

## 2023-07-19 VITALS
DIASTOLIC BLOOD PRESSURE: 80 MMHG | HEIGHT: 70 IN | OXYGEN SATURATION: 98 % | WEIGHT: 197 LBS | TEMPERATURE: 97.2 F | HEART RATE: 68 BPM | SYSTOLIC BLOOD PRESSURE: 138 MMHG | RESPIRATION RATE: 16 BRPM | BODY MASS INDEX: 28.2 KG/M2

## 2023-07-19 DIAGNOSIS — Z86.19 H/O COLD SORES: ICD-10-CM

## 2023-07-19 DIAGNOSIS — I10 PRIMARY HYPERTENSION: ICD-10-CM

## 2023-07-19 DIAGNOSIS — B00.1 COLD SORE: ICD-10-CM

## 2023-07-19 PROCEDURE — 3075F SYST BP GE 130 - 139MM HG: CPT | Performed by: PHYSICIAN ASSISTANT

## 2023-07-19 PROCEDURE — 99214 OFFICE O/P EST MOD 30 MIN: CPT | Performed by: PHYSICIAN ASSISTANT

## 2023-07-19 PROCEDURE — 3079F DIAST BP 80-89 MM HG: CPT | Performed by: PHYSICIAN ASSISTANT

## 2023-07-19 ASSESSMENT — FIBROSIS 4 INDEX: FIB4 SCORE: 1.55

## 2023-07-19 NOTE — PROGRESS NOTES
"Chief Complaint   Patient presents with    Hypertension Follow-up       HPI  Charlie Avila is a 63 y.o. male here today for  f/u:  Patient continues on amlodipine 10 Mg daily, blood pressure is  Elevated, denies any SOB or CP.  Patient is active and works out.  Has 2 cups of coffee daily.  Otherwise fine without any stressors in life and doing well.      Patient has noticed a blister burning-like lesion over her lip, having months ago and again this months.  Has history of cold sores however it usually happens once a months.              Exam:  /80 (BP Location: Left arm, Patient Position: Sitting)   Pulse 68   Temp 36.2 °C (97.2 °F) (Temporal)   Resp 16   Ht 1.778 m (5' 10\")   Wt 89.4 kg (197 lb)   SpO2 98%       Constitutional: Alert, oriented in no acute distress.  Psych: Eye contact is good, speech goal directed, affect calm  Eyes: Conjunctiva non-injected, sclera non-icteric.  Lungs: Unlabored respiratory effort, clear to auscultation bilaterally with good excursion, no wheez or rhonci  CV: regular rate and rhythm. No lower extremity edema  Lip: Small lesion, aphthous looking over left side of lower lip.  Lip lesion appears to be a cold sore      A/P:  1. H/O cold sores      2. Cold sore  Lip lesion looks to be cold sore  Valacyclovir 1000 Mg daily    3. Primary hypertension  Patient has his BP numbers from home, mostly elevated above 140.  Does not have his BP cuff with him.  Blood pressure improved in office to 138/80  Very hesitant to add another medicine.  States he has had SE to BP meds   Lisinopril --> hang over   Amlodipine --> morning make him feel crappy and tired  At night kept him awake    Continue amlodipine 10 Mg daily.        F/U: 6 month     "

## 2023-08-25 LAB
ALBUMIN SERPL-MCNC: 4.9 G/DL (ref 3.9–4.9)
ALBUMIN/CREAT UR: 8 MG/G CREAT (ref 0–29)
ALBUMIN/GLOB SERPL: 1.8 {RATIO} (ref 1.2–2.2)
ALP SERPL-CCNC: 59 IU/L (ref 44–121)
ALT SERPL-CCNC: 28 IU/L (ref 0–44)
AST SERPL-CCNC: 26 IU/L (ref 0–40)
BASOPHILS # BLD AUTO: 0 X10E3/UL (ref 0–0.2)
BASOPHILS NFR BLD AUTO: 0 %
BILIRUB SERPL-MCNC: 0.8 MG/DL (ref 0–1.2)
BUN SERPL-MCNC: 23 MG/DL (ref 8–27)
BUN/CREAT SERPL: 18 (ref 10–24)
CALCIUM SERPL-MCNC: 9.5 MG/DL (ref 8.6–10.2)
CHLORIDE SERPL-SCNC: 101 MMOL/L (ref 96–106)
CHOLEST SERPL-MCNC: 163 MG/DL (ref 100–199)
CO2 SERPL-SCNC: 19 MMOL/L (ref 20–29)
CREAT SERPL-MCNC: 1.26 MG/DL (ref 0.76–1.27)
CREAT UR-MCNC: 186.4 MG/DL
EGFRCR SERPLBLD CKD-EPI 2021: 63 ML/MIN/1.73
EOSINOPHIL # BLD AUTO: 0.1 X10E3/UL (ref 0–0.4)
EOSINOPHIL NFR BLD AUTO: 1 %
ERYTHROCYTE [DISTWIDTH] IN BLOOD BY AUTOMATED COUNT: 14.4 % (ref 11.6–15.4)
GLOBULIN SER CALC-MCNC: 2.8 G/DL (ref 1.5–4.5)
GLUCOSE SERPL-MCNC: 87 MG/DL (ref 70–99)
HCT VFR BLD AUTO: 46.1 % (ref 37.5–51)
HCV IGG SERPL QL IA: NON REACTIVE
HDLC SERPL-MCNC: 41 MG/DL
HGB BLD-MCNC: 14.9 G/DL (ref 13–17.7)
IMM GRANULOCYTES # BLD AUTO: 0 X10E3/UL (ref 0–0.1)
IMM GRANULOCYTES NFR BLD AUTO: 0 %
IMMATURE CELLS  115398: NORMAL
LABORATORY COMMENT REPORT: ABNORMAL
LDLC SERPL CALC-MCNC: 108 MG/DL (ref 0–99)
LYMPHOCYTES # BLD AUTO: 2.1 X10E3/UL (ref 0.7–3.1)
LYMPHOCYTES NFR BLD AUTO: 28 %
MCH RBC QN AUTO: 27.9 PG (ref 26.6–33)
MCHC RBC AUTO-ENTMCNC: 32.3 G/DL (ref 31.5–35.7)
MCV RBC AUTO: 86 FL (ref 79–97)
MICROALBUMIN UR-MCNC: 14.9 UG/ML
MONOCYTES # BLD AUTO: 0.6 X10E3/UL (ref 0.1–0.9)
MONOCYTES NFR BLD AUTO: 7 %
MORPHOLOGY BLD-IMP: NORMAL
NEUTROPHILS # BLD AUTO: 4.7 X10E3/UL (ref 1.4–7)
NEUTROPHILS NFR BLD AUTO: 64 %
NRBC BLD AUTO-RTO: NORMAL %
PLATELET # BLD AUTO: 212 X10E3/UL (ref 150–450)
POTASSIUM SERPL-SCNC: 4.5 MMOL/L (ref 3.5–5.2)
PROT SERPL-MCNC: 7.7 G/DL (ref 6–8.5)
PSA SERPL-MCNC: 1.9 NG/ML (ref 0–4)
RBC # BLD AUTO: 5.34 X10E6/UL (ref 4.14–5.8)
SODIUM SERPL-SCNC: 136 MMOL/L (ref 134–144)
TRIGL SERPL-MCNC: 72 MG/DL (ref 0–149)
VLDLC SERPL CALC-MCNC: 14 MG/DL (ref 5–40)
WBC # BLD AUTO: 7.4 X10E3/UL (ref 3.4–10.8)

## 2023-08-31 ENCOUNTER — HOSPITAL ENCOUNTER (EMERGENCY)
Facility: MEDICAL CENTER | Age: 63
End: 2023-08-31
Attending: STUDENT IN AN ORGANIZED HEALTH CARE EDUCATION/TRAINING PROGRAM
Payer: MEDICARE

## 2023-08-31 ENCOUNTER — HOSPITAL ENCOUNTER (OUTPATIENT)
Dept: LAB | Facility: MEDICAL CENTER | Age: 63
End: 2023-08-31
Payer: MEDICARE

## 2023-08-31 ENCOUNTER — OFFICE VISIT (OUTPATIENT)
Dept: URGENT CARE | Facility: CLINIC | Age: 63
End: 2023-08-31
Payer: MEDICARE

## 2023-08-31 ENCOUNTER — HOSPITAL ENCOUNTER (OUTPATIENT)
Dept: RADIOLOGY | Facility: MEDICAL CENTER | Age: 63
End: 2023-08-31
Payer: MEDICARE

## 2023-08-31 VITALS
BODY MASS INDEX: 27.2 KG/M2 | HEART RATE: 79 BPM | OXYGEN SATURATION: 99 % | SYSTOLIC BLOOD PRESSURE: 140 MMHG | HEIGHT: 70 IN | TEMPERATURE: 98 F | RESPIRATION RATE: 16 BRPM | WEIGHT: 190 LBS | DIASTOLIC BLOOD PRESSURE: 92 MMHG

## 2023-08-31 VITALS
DIASTOLIC BLOOD PRESSURE: 87 MMHG | SYSTOLIC BLOOD PRESSURE: 150 MMHG | RESPIRATION RATE: 20 BRPM | HEIGHT: 70 IN | BODY MASS INDEX: 27.9 KG/M2 | HEART RATE: 66 BPM | TEMPERATURE: 98.4 F | WEIGHT: 194.89 LBS | OXYGEN SATURATION: 94 %

## 2023-08-31 DIAGNOSIS — R10.9 FLANK PAIN: ICD-10-CM

## 2023-08-31 DIAGNOSIS — R03.0 ELEVATED BLOOD PRESSURE READING: ICD-10-CM

## 2023-08-31 DIAGNOSIS — N20.1 URETEROLITHIASIS: ICD-10-CM

## 2023-08-31 DIAGNOSIS — N17.9 AKI (ACUTE KIDNEY INJURY) (HCC): ICD-10-CM

## 2023-08-31 LAB
ALBUMIN SERPL BCP-MCNC: 4.6 G/DL (ref 3.2–4.9)
ALBUMIN SERPL BCP-MCNC: 4.9 G/DL (ref 3.2–4.9)
ALBUMIN/GLOB SERPL: 1.4 G/DL
ALBUMIN/GLOB SERPL: 1.6 G/DL
ALP SERPL-CCNC: 69 U/L (ref 30–99)
ALP SERPL-CCNC: 77 U/L (ref 30–99)
ALT SERPL-CCNC: 22 U/L (ref 2–50)
ALT SERPL-CCNC: 23 U/L (ref 2–50)
ANION GAP SERPL CALC-SCNC: 12 MMOL/L (ref 7–16)
ANION GAP SERPL CALC-SCNC: 14 MMOL/L (ref 7–16)
APPEARANCE UR: CLEAR
APPEARANCE UR: CLEAR
AST SERPL-CCNC: 19 U/L (ref 12–45)
AST SERPL-CCNC: 21 U/L (ref 12–45)
BACTERIA #/AREA URNS HPF: ABNORMAL /HPF
BASOPHILS # BLD AUTO: 0.9 % (ref 0–1.8)
BASOPHILS # BLD: 0.06 K/UL (ref 0–0.12)
BILIRUB SERPL-MCNC: 0.4 MG/DL (ref 0.1–1.5)
BILIRUB SERPL-MCNC: 0.6 MG/DL (ref 0.1–1.5)
BILIRUB UR QL STRIP.AUTO: NEGATIVE
BILIRUB UR STRIP-MCNC: NEGATIVE MG/DL
BUN SERPL-MCNC: 29 MG/DL (ref 8–22)
BUN SERPL-MCNC: 33 MG/DL (ref 8–22)
CALCIUM ALBUM COR SERPL-MCNC: 9.2 MG/DL (ref 8.5–10.5)
CALCIUM ALBUM COR SERPL-MCNC: 9.4 MG/DL (ref 8.5–10.5)
CALCIUM SERPL-MCNC: 9.9 MG/DL (ref 8.4–10.2)
CALCIUM SERPL-MCNC: 9.9 MG/DL (ref 8.5–10.5)
CHLORIDE SERPL-SCNC: 103 MMOL/L (ref 96–112)
CHLORIDE SERPL-SCNC: 104 MMOL/L (ref 96–112)
CO2 SERPL-SCNC: 21 MMOL/L (ref 20–33)
CO2 SERPL-SCNC: 23 MMOL/L (ref 20–33)
COLOR UR AUTO: YELLOW
COLOR UR: YELLOW
CREAT SERPL-MCNC: 1.63 MG/DL (ref 0.5–1.4)
CREAT SERPL-MCNC: 1.74 MG/DL (ref 0.5–1.4)
EOSINOPHIL # BLD AUTO: 0.1 K/UL (ref 0–0.51)
EOSINOPHIL NFR BLD: 1.5 % (ref 0–6.9)
EPI CELLS #/AREA URNS HPF: ABNORMAL /HPF
ERYTHROCYTE [DISTWIDTH] IN BLOOD BY AUTOMATED COUNT: 44.9 FL (ref 35.9–50)
GFR SERPLBLD CREATININE-BSD FMLA CKD-EPI: 43 ML/MIN/1.73 M 2
GFR SERPLBLD CREATININE-BSD FMLA CKD-EPI: 46 ML/MIN/1.73 M 2
GLOBULIN SER CALC-MCNC: 3.1 G/DL (ref 1.9–3.5)
GLOBULIN SER CALC-MCNC: 3.2 G/DL (ref 1.9–3.5)
GLUCOSE SERPL-MCNC: 81 MG/DL (ref 65–99)
GLUCOSE SERPL-MCNC: 89 MG/DL (ref 65–99)
GLUCOSE UR STRIP.AUTO-MCNC: NEGATIVE MG/DL
GLUCOSE UR STRIP.AUTO-MCNC: NEGATIVE MG/DL
HCT VFR BLD AUTO: 47.1 % (ref 42–52)
HGB BLD-MCNC: 15 G/DL (ref 14–18)
IMM GRANULOCYTES # BLD AUTO: 0.02 K/UL (ref 0–0.11)
IMM GRANULOCYTES NFR BLD AUTO: 0.3 % (ref 0–0.9)
KETONES UR STRIP.AUTO-MCNC: NEGATIVE MG/DL
KETONES UR STRIP.AUTO-MCNC: NEGATIVE MG/DL
LEUKOCYTE ESTERASE UR QL STRIP.AUTO: NEGATIVE
LEUKOCYTE ESTERASE UR QL STRIP.AUTO: NORMAL
LIPASE SERPL-CCNC: 28 U/L (ref 11–82)
LYMPHOCYTES # BLD AUTO: 2.31 K/UL (ref 1–4.8)
LYMPHOCYTES NFR BLD: 34.5 % (ref 22–41)
MCH RBC QN AUTO: 27.7 PG (ref 27–33)
MCHC RBC AUTO-ENTMCNC: 31.8 G/DL (ref 32.3–36.5)
MCV RBC AUTO: 86.9 FL (ref 81.4–97.8)
MICRO URNS: ABNORMAL
MONOCYTES # BLD AUTO: 0.56 K/UL (ref 0–0.85)
MONOCYTES NFR BLD AUTO: 8.4 % (ref 0–13.4)
MUCOUS THREADS #/AREA URNS HPF: ABNORMAL /HPF
NEUTROPHILS # BLD AUTO: 3.65 K/UL (ref 1.82–7.42)
NEUTROPHILS NFR BLD: 54.4 % (ref 44–72)
NITRITE UR QL STRIP.AUTO: NEGATIVE
NITRITE UR QL STRIP.AUTO: NEGATIVE
NRBC # BLD AUTO: 0 K/UL
NRBC BLD-RTO: 0 /100 WBC (ref 0–0.2)
PH UR STRIP.AUTO: 5 [PH] (ref 5–8)
PH UR STRIP.AUTO: 5 [PH] (ref 5–8)
PLATELET # BLD AUTO: 229 K/UL (ref 164–446)
PMV BLD AUTO: 10.9 FL (ref 9–12.9)
POTASSIUM SERPL-SCNC: 4.9 MMOL/L (ref 3.6–5.5)
POTASSIUM SERPL-SCNC: 4.9 MMOL/L (ref 3.6–5.5)
PROT SERPL-MCNC: 7.8 G/DL (ref 6–8.2)
PROT SERPL-MCNC: 8 G/DL (ref 6–8.2)
PROT UR QL STRIP: NEGATIVE MG/DL
PROT UR QL STRIP: NEGATIVE MG/DL
RBC # BLD AUTO: 5.42 M/UL (ref 4.7–6.1)
RBC # URNS HPF: ABNORMAL /HPF
RBC UR QL AUTO: ABNORMAL
RBC UR QL AUTO: NORMAL
SODIUM SERPL-SCNC: 138 MMOL/L (ref 135–145)
SODIUM SERPL-SCNC: 139 MMOL/L (ref 135–145)
SP GR UR STRIP.AUTO: 1.01
SP GR UR STRIP.AUTO: 1.02
UROBILINOGEN UR STRIP-MCNC: 1 MG/DL
WBC # BLD AUTO: 6.7 K/UL (ref 4.8–10.8)
WBC #/AREA URNS HPF: ABNORMAL /HPF

## 2023-08-31 PROCEDURE — 99214 OFFICE O/P EST MOD 30 MIN: CPT

## 2023-08-31 PROCEDURE — 36415 COLL VENOUS BLD VENIPUNCTURE: CPT

## 2023-08-31 PROCEDURE — A9270 NON-COVERED ITEM OR SERVICE: HCPCS | Performed by: STUDENT IN AN ORGANIZED HEALTH CARE EDUCATION/TRAINING PROGRAM

## 2023-08-31 PROCEDURE — 3080F DIAST BP >= 90 MM HG: CPT

## 2023-08-31 PROCEDURE — 80053 COMPREHEN METABOLIC PANEL: CPT | Mod: 91

## 2023-08-31 PROCEDURE — 99284 EMERGENCY DEPT VISIT MOD MDM: CPT

## 2023-08-31 PROCEDURE — 85025 COMPLETE CBC W/AUTO DIFF WBC: CPT

## 2023-08-31 PROCEDURE — 700117 HCHG RX CONTRAST REV CODE 255

## 2023-08-31 PROCEDURE — 74177 CT ABD & PELVIS W/CONTRAST: CPT

## 2023-08-31 PROCEDURE — 3077F SYST BP >= 140 MM HG: CPT

## 2023-08-31 PROCEDURE — 81002 URINALYSIS NONAUTO W/O SCOPE: CPT

## 2023-08-31 PROCEDURE — 81001 URINALYSIS AUTO W/SCOPE: CPT

## 2023-08-31 PROCEDURE — 700102 HCHG RX REV CODE 250 W/ 637 OVERRIDE(OP): Performed by: STUDENT IN AN ORGANIZED HEALTH CARE EDUCATION/TRAINING PROGRAM

## 2023-08-31 PROCEDURE — 80053 COMPREHEN METABOLIC PANEL: CPT

## 2023-08-31 PROCEDURE — 83690 ASSAY OF LIPASE: CPT

## 2023-08-31 RX ORDER — TAMSULOSIN HYDROCHLORIDE 0.4 MG/1
0.4 CAPSULE ORAL DAILY
Qty: 14 CAPSULE | Refills: 0 | Status: SHIPPED | OUTPATIENT
Start: 2023-08-31 | End: 2023-09-14

## 2023-08-31 RX ORDER — TAMSULOSIN HYDROCHLORIDE 0.4 MG/1
0.4 CAPSULE ORAL ONCE
Status: COMPLETED | OUTPATIENT
Start: 2023-08-31 | End: 2023-08-31

## 2023-08-31 RX ADMIN — TAMSULOSIN HYDROCHLORIDE 0.4 MG: 0.4 CAPSULE ORAL at 18:09

## 2023-08-31 RX ADMIN — IOHEXOL 100 ML: 350 INJECTION, SOLUTION INTRAVENOUS at 16:00

## 2023-08-31 ASSESSMENT — FIBROSIS 4 INDEX
FIB4 SCORE: 1.51
FIB4 SCORE: 1.24

## 2023-08-31 NOTE — PROGRESS NOTES
Chief Complaint   Patient presents with    Back Pain     R lower back pain, [id number] 1 week        HISTORY OF PRESENT ILLNESS: Patient is a pleasant 63 y.o. male who presents to urgent care today ongoing right-sided flank pain which is colicky in nature for the last week.  Pain is especially noted to be worse after eating, once patient urinates he states he feels slightly better.  The pain comes and goes.  Rated as high as a 6 out of 10.  He has not taken anything for this.  No previous history noted otherwise.  Patient denies any nausea or vomiting, no noted blood in his stool, no diarrhea.  He does note some slight constipation at times.    Patient Active Problem List    Diagnosis Date Noted    H/O cold sores 07/19/2023    Chronic low back pain 05/24/2021    Primary hypertension 05/24/2021    Gout, arthropathy 05/04/2015    Hemochromatosis 05/01/2014    Stage 3a chronic kidney disease (HCC) 04/30/2013    Hyperlipidemia 04/26/2010       Allergies:Patient has no known allergies.    Current Outpatient Medications Ordered in Epic   Medication Sig Dispense Refill    amLODIPine (NORVASC) 10 MG Tab Take 1 Tablet by mouth every day. 90 Tablet 1    naproxen (NAPROSYN) 375 MG Tab TAKE 1 TABLET BY MOUTH TWICE DAILY WITH MEALS 60 Tablet 1    allopurinol (ZYLOPRIM) 300 MG Tab TAKE ONE TABLET BY MOUTH ONCE DAILY 90 Tablet 1    lovastatin (MEVACOR) 20 MG Tab Take 1 Tablet by mouth every day. 90 Tablet 1    valacyclovir (VALTREX) 1 GM Tab TAKE 2 TABLETS BY MOUTH EVERY 12 HOURS FOR 1 DAY AS NEEDED FOR OUTBREAKS. 12 Tablet 3     No current Epic-ordered facility-administered medications on file.       Past Medical History:   Diagnosis Date    Fatty liver 04/30/2013    HEMOCHROMATOSIS 04/26/2010    HSV-1 (herpes simplex virus 1) infection     Hyperlipidemia 04/26/2010    Hypertension        Social History     Tobacco Use    Smoking status: Never    Smokeless tobacco: Never   Vaping Use    Vaping Use: Never used  "  Substance Use Topics    Alcohol use: Yes     Alcohol/week: 1.2 oz     Types: 2 Glasses of wine per week    Drug use: Never       Family Status   Relation Name Status    [place] Segovia     Aris Schneider      Family History   Problem Relation Age of Onset    Heart Disease Mother     Heart Disease Father     Cancer Father         leukemia       ROS:  Review of Systems   Constitutional: Negative for fever, chills, weight loss, malaise, and fatigue.   HENT: Negative for ear pain, nosebleeds, congestion, sore throat and neck pain.    Eyes: Negative for vision changes.   Neuro: Negative for headache, sensory changes, weakness, seizure, LOC.   Cardiovascular: Negative for chest pain, palpitations, orthopnea and leg swelling.   Respiratory: Negative for cough, sputum production, shortness of breath and wheezing.   Gastrointestinal: Negative for abdominal pain, nausea, vomiting or diarrhea.  Noted pain to the right flank  Genitourinary: Negative for dysuria, urgency and frequency.  Musculoskeletal: Negative for falls, neck pain, back pain, joint pain, myalgias.   Skin: Negative for rash, diaphoresis.     Exam:  BP (!) 140/92   Pulse 79   Temp 36.7 °C (98 °F)   Resp 16   Ht 1.778 m (5' 10\")   Wt 86.2 kg (190 lb)   SpO2 99%   General: well-nourished, well-developed male in NAD  Head: normocephalic, atraumatic  Eyes: PERRLA, no conjunctival injection, acuity grossly intact, lids normal.  Ears: normal shape and symmetry, no tenderness, no discharge. External canals are without any significant edema or erythema. Tympanic membranes are without any inflammation, no effusion. Gross auditory acuity is intact.  Nose: symmetrical without tenderness, no discharge.  Mouth/Throat: reasonable hygiene, no erythema, exudates or tonsillar enlargement.  Neck: no masses, range of motion within normal limits, no tracheal deviation. No obvious thyroid enlargement.   Lymph: no cervical adenopathy. No supraclavicular adenopathy. "   Neuro: alert and oriented. Cranial nerves 1-12 grossly intact. No sensory deficit.   Cardiovascular: regular rate and rhythm. No edema.  Pulmonary: no distress. Chest is symmetrical with respiration, no wheezes, crackles, or rhonchi.   Abdomen: soft, non-tender, no guarding, no hepatosplenomegaly.  Negative CVA tenderness  Musculoskeletal: no clubbing, appropriate muscle tone, gait is stable.  Skin: warm, dry, intact, no clubbing, no cyanosis, no rashes.   Psych: appropriate mood, affect, judgement.       Assessment/Plan:  1. Flank pain  POCT Urinalysis    CBC WITH DIFFERENTIAL    Comp Metabolic Panel    LIPASE    CT-ABDOMEN-PELVIS WITH       Patient is a pleasant 63 y.o. male who presents to urgent care today ongoing right-sided flank pain which is colicky in nature for the last week.  Pain is especially noted to be worse after eating, once patient urinates he states he feels slightly better.  The pain comes and goes.  Rated as high as a 6 out of 10.  He has not taken anything for this.  No previous history noted otherwise.  Patient denies any nausea or vomiting, no noted blood in his stool, no diarrhea.  He does note some slight constipation at times.  On physical exam patient has negative CVA tenderness, stomach is soft and nontender.  Physical exam is otherwise within normal limits today.  POCT urinalysis complete shows positive small amount of leukocytes along with a small amount of blood.  Labs ordered to rule out potential causes to include CBC, CMP and lipase.  CT of the abdomen and pelvis was ordered to rule out other potential causes.  Differential diagnosis to include kidney stone/cholelithiasis/back strain/gastritis.  It is noted that patient has a history of chronic kidney disease, he has not had a GFR for quite some time last was done in 2014.  Last creatinine and bun were within normal limits.  Given the circumstances I do think CT is appropriate. Patient is aware of the plan of care and agreeable  at this time.  Advised to follow-up if they continue to get worse or do not improve.     Noted labs and CT scan results.  Patient has had a bump in his creatinine as well as his BUN.  GFR is currently 43.  CT scan shows a possible obstructing 5 mm stone on the right side as well as a 1 mm stone.  Patient appears to have hydronephrosis as well.  Concerning for potential kidney damage.  I did go ahead and send the patient to the emergency room for further evaluation for consideration of IV fluids, medication management, as well as potential intervention to remove the stone.  Spoke with patient extensively on the phone, he verbalized understanding, he states he is able to drive himself at this time.      Supportive care, differential diagnoses, and indications for immediate follow-up discussed with patient.   Pathogenesis of diagnosis discussed including typical length and natural progression.   Instructed to return to clinic or nearest emergency department for any change in condition, further concerns, or worsening of symptoms.  Patient states understanding of the plan of care and discharge instructions.  Instructed to make an appointment, for follow up, with primary care provider.        Please note that this dictation was created using voice recognition software. I have made every reasonable attempt to correct obvious errors, but I expect that there are errors of grammar and possibly content that I did not discover before finalizing the note.      Zandra YOU

## 2023-08-31 NOTE — ED TRIAGE NOTES
"Bib self for above complaints.     Chief Complaint   Patient presents with    Flank Pain     Right sided lower flank pain. Pt endorses that he had no BM since Thursday took a laxative and passed one hard stool yesterday. Pain seems to get worse after eating, pt still has gall bladder. Pt endorses slightly increased urinary frequency.      BP (!) 178/102   Pulse 89   Temp 36.4 °C (97.5 °F) (Temporal)   Resp 16   Ht 1.778 m (5' 10\")   Wt 88.4 kg (194 lb 14.2 oz)   SpO2 94%   BMI 27.96 kg/m²     "

## 2023-09-01 NOTE — ED PROVIDER NOTES
ED Provider Note    CHIEF COMPLAINT  Chief Complaint   Patient presents with    Flank Pain     Right sided lower flank pain. Pt endorses that he had no BM since Thursday took a laxative and passed one hard stool yesterday. Pain seems to get worse after eating, pt still has gall bladder. Pt endorses slightly increased urinary frequency.        EXTERNAL RECORDS REVIEWED  Outpatient Notes      HPI/ROS  LIMITATION TO HISTORY   Select: : None  OUTSIDE HISTORIAN(S):  Friend      Charlie Avila is a 63 y.o. male with past medical history of hyperlipidemia and hypertension presenting to the emergency department from his primary care physician's office after he was found to have a 5 mm right ureteral stone at the ureterovesical junction.  Patient says that he has had colicky right-sided flank pain for the last 7 days, intermittent in nature, not improved with pain medication, seems to improve after he urinates.  He was seen by his primary care physician's office today, he underwent a CT scan which revealed a 5 mm right ureteral stone at the UVJ with mild hydronephrosis and he was told to come into the emergency department for evaluation.  Review of his outpatient labs were done today reveals a mild elevation in creatinine 1.7 from baseline 1.3-1.5.     Patient says that he is pain-free in the emergency department right now.  Denies any associated nausea, vomiting, fever, chills, dysuria.  No prior history of nephrolithiasis, has never seen a urologist.    Says that he eats a diet that is high in protein, typically keeps himself well-hydrated drinking approximately 1 gallon of water per day but today he feels a bit dehydrated.  Says that he wants to hydrate orally       PAST MEDICAL HISTORY   has a past medical history of Fatty liver (04/30/2013), HEMOCHROMATOSIS (04/26/2010), HSV-1 (herpes simplex virus 1) infection, Hyperlipidemia (04/26/2010), and Hypertension.    SURGICAL HISTORY   has a past surgical history that  "includes laminotomy and vasectomy (October 2010).    FAMILY HISTORY  Family History   Problem Relation Age of Onset    Heart Disease Mother     Heart Disease Father     Cancer Father         leukemia       SOCIAL HISTORY  Social History     Tobacco Use    Smoking status: Never    Smokeless tobacco: Never   Vaping Use    Vaping Use: Never used   Substance and Sexual Activity    Alcohol use: Yes     Alcohol/week: 1.2 oz     Types: 2 Glasses of wine per week     Comment: rare    Drug use: Never    Sexual activity: Not Currently     Partners: Female     Birth control/protection: None     Comment: no kids       CURRENT MEDICATIONS  Home Medications       Reviewed by Evan Cortes R.N. (Registered Nurse) on 08/31/23 at 1631  Med List Status: Not Addressed     Medication Last Dose Status   allopurinol (ZYLOPRIM) 300 MG Tab  Active   amLODIPine (NORVASC) 10 MG Tab  Active   lovastatin (MEVACOR) 20 MG Tab  Active   naproxen (NAPROSYN) 375 MG Tab  Active   valacyclovir (VALTREX) 1 GM Tab  Active                    ALLERGIES  No Known Allergies    PHYSICAL EXAM  VITAL SIGNS: BP (!) 150/87   Pulse 66   Temp 36.9 °C (98.4 °F) (Temporal)   Resp 20   Ht 1.778 m (5' 10\")   Wt 88.4 kg (194 lb 14.2 oz)   SpO2 94%   BMI 27.96 kg/m²    General: Well- appearing, non-toxic, no acute distress  Neuro: oriented x 3, moving all extremities.   Resp: clear to auscultation, no increased work of breathing  CV: Regular rate and rhythm  Abd: Soft, non-tender, non-distended  : No CVA tenderness palpation  Extremities: No peripheral edema  Psych: lucid and conversational, pleasant and cooperative    DIAGNOSTIC STUDIES / PROCEDURES  EKG  No indication for EKG    LABS  Chemistry revealing creatinine 1.6 from 1.7 earlier today.  Baseline creatinine is 1.3    RADIOLOGY  I have independently interpreted the diagnostic imaging associated with this visit and am waiting the final reading from the radiologist.   My preliminary interpretation is " as follows: 4 mm stone at the ureterovesical junction of the right ureter.  Radiologist interpretation:     1.  5 mm right lower ureteral stone present just above the ureterovesical junction. There is mild hydronephrosis seen about that level with decreased enhancement of the right kidney compared to the left.     2.  1 mm right upper pole renal calyceal stone.     3.  Fatty liver.     4.  Vascular calcification.    COURSE & MEDICAL DECISION MAKING    ED Observation Status? No; Patient does not meet criteria for ED Observation.     INITIAL ASSESSMENT, COURSE AND PLAN  Care Narrative:    This is a 63-year-old male presenting to the emergency department after he was found to have a right-sided ureteral stone on an outpatient CT scan.  Has had symptoms for approximately 7 days, has a 5 mm stone at the ureterovesical junction.  Reviewed outpatient labs revealing a borderline elevation in creatinine at 1.7 from baseline 1.3-1.5.  In the emergency department now, patient is tolerating p.o. and is not in any degree of pain.  He wants to hydrate orally he does not want any pain medication in the emergency department.  I will plan to repeat creatinine and obtain a urinalysis to ensure no associated urinary tract infection.  There is no current indication for urology consultation in the emergency department.    Creatinine in the emergency department is downtrending from chemistry obtained earlier today.  Urinalysis is clean.   Will prescribe Flomax for the next 2 weeks, refer to outpatient urology for close outpatient follow-up.  Patient is amenable to the above plan.  Appropriate for discharge, return precaution discussed.     ADDITIONAL PROBLEM LIST    DISPOSITION AND DISCUSSIONS  I have discussed management of the patient with the following physicians and MICHELLE's:      Discussion of management with other Q[facility] or appropriate source(s):      Escalation of care considered, and ultimately not performed:    Barriers to care at  this time, including but not limited to:  none .     Decision tools and prescription drugs considered including, but not limited to: none.    FINAL DIAGNOSIS  1. Ureterolithiasis    2. Elevated blood pressure reading    3. RAMAN (acute kidney injury) (HCC)           Electronically signed by: Jordan Dubose D.O., 8/31/2023 6:00 PM

## 2023-09-01 NOTE — ED NOTES
Blood work collected and sent to lab, Pt medicated per MAR, Pt denied having any needs at this time, no distress noted;

## 2023-09-01 NOTE — DISCHARGE INSTRUCTIONS
You are seen in the emergency department for a kidney stone which has lodged in your right ureter.  You are experiencing right flank pain as a result of the kidney stone passing in your bladder.  We are going to prescribe you a medication called Flomax which may help the kidney stone pass.  You need to follow-up with urology outpatient.    We wrote a referral, you should receive a call to set up an appointment.  Please keep yourself well-hydrated.  Foods high in sodium, cola beverages, fast foods, processed meats, certain supplements, black tea, chocolate, spinach, soy milk, almonds, cashews, soy beans.     Follow-up with your primary care physician for repeat chemistry panel to assess renal function.    If you experience intractable pain, nausea, vomiting, come back to the emergency department immediately for reevaluation

## 2023-12-04 DIAGNOSIS — M25.50 ARTHRALGIA, UNSPECIFIED JOINT: ICD-10-CM

## 2023-12-05 RX ORDER — NAPROXEN 375 MG/1
375 TABLET ORAL 2 TIMES DAILY WITH MEALS
Qty: 60 TABLET | Refills: 1 | Status: SHIPPED | OUTPATIENT
Start: 2023-12-05

## 2023-12-26 DIAGNOSIS — I10 PRIMARY HYPERTENSION: ICD-10-CM

## 2023-12-26 RX ORDER — AMLODIPINE BESYLATE 10 MG/1
10 TABLET ORAL DAILY
Qty: 90 TABLET | Refills: 1 | Status: SHIPPED | OUTPATIENT
Start: 2023-12-26

## 2024-01-23 ENCOUNTER — OFFICE VISIT (OUTPATIENT)
Dept: MEDICAL GROUP | Facility: MEDICAL CENTER | Age: 64
End: 2024-01-23
Payer: MEDICARE

## 2024-01-23 ENCOUNTER — PATIENT MESSAGE (OUTPATIENT)
Dept: MEDICAL GROUP | Facility: MEDICAL CENTER | Age: 64
End: 2024-01-23

## 2024-01-23 VITALS
RESPIRATION RATE: 16 BRPM | WEIGHT: 200.8 LBS | SYSTOLIC BLOOD PRESSURE: 154 MMHG | HEIGHT: 70 IN | BODY MASS INDEX: 28.75 KG/M2 | OXYGEN SATURATION: 97 % | DIASTOLIC BLOOD PRESSURE: 80 MMHG | TEMPERATURE: 97.2 F | HEART RATE: 70 BPM

## 2024-01-23 DIAGNOSIS — N18.31 STAGE 3A CHRONIC KIDNEY DISEASE: ICD-10-CM

## 2024-01-23 DIAGNOSIS — E78.49 OTHER HYPERLIPIDEMIA: ICD-10-CM

## 2024-01-23 DIAGNOSIS — I10 PRIMARY HYPERTENSION: ICD-10-CM

## 2024-01-23 PROCEDURE — 3077F SYST BP >= 140 MM HG: CPT | Performed by: PHYSICIAN ASSISTANT

## 2024-01-23 PROCEDURE — 99214 OFFICE O/P EST MOD 30 MIN: CPT | Performed by: PHYSICIAN ASSISTANT

## 2024-01-23 PROCEDURE — 3079F DIAST BP 80-89 MM HG: CPT | Performed by: PHYSICIAN ASSISTANT

## 2024-01-23 RX ORDER — LOSARTAN POTASSIUM 50 MG/1
50 TABLET ORAL DAILY
Qty: 90 TABLET | Refills: 1 | Status: SHIPPED | OUTPATIENT
Start: 2024-01-23

## 2024-01-23 RX ORDER — LOVASTATIN 20 MG/1
20 TABLET ORAL DAILY
Qty: 90 TABLET | Refills: 1 | Status: SHIPPED | OUTPATIENT
Start: 2024-01-23

## 2024-01-23 ASSESSMENT — PATIENT HEALTH QUESTIONNAIRE - PHQ9: CLINICAL INTERPRETATION OF PHQ2 SCORE: 0

## 2024-01-23 ASSESSMENT — FIBROSIS 4 INDEX: FIB4 SCORE: 1.15

## 2024-01-23 NOTE — PROGRESS NOTES
"Chief Complaint   Patient presents with    Hypertension Follow-up       HPI  Charlie Avila is a 63 y.o. male here today for f/u:    Patient with history of hypertension and CKD stage III, blood pressure is not controlled, continues on amlodipine 10 Mg daily.            Exam:  BP (!) 154/80 (BP Location: Left arm, Patient Position: Sitting)   Pulse 70   Temp 36.2 °C (97.2 °F) (Temporal)   Resp 16   Ht 1.778 m (5' 10\")   Wt 91.1 kg (200 lb 12.8 oz)   SpO2 97%       Constitutional: Alert, oriented in no acute distress.  Psych: Eye contact is good, speech goal directed, affect calm  Eyes: Conjunctiva non-injected, sclera non-icteric.  Lungs: Unlabored respiratory effort, clear to auscultation bilaterally with good excursion, no wheez or rhonci  CV: regular rate and rhythm. No lower extremity edema        A/P:    1. Primary hypertension  Patient BP elevated above goal  Continue amlodipine 10 Mg daily, start half a pill for the first week  Adding losartan 50 Mg daily  - Basic Metabolic Panel; Future    - losartan (COZAAR) 50 MG Tab; Take 1 Tablet by mouth every day.  Dispense: 90 Tablet; Refill: 1    2. Stage 3a chronic kidney disease (HCC)  Recently was treated for ureteral stone  GFR has gradually decreasing      F/U: 6 wks     "

## 2024-02-21 NOTE — TELEPHONE ENCOUNTER
Chief Complaint   Patient presents with    Back Pain     low    Office Visit     Date of Injury: 2024  Initial Treatment Date: 2024  Date Last Seen: 2024  Mechanism of Onset: Sweeping/Bending  Occupation: Cardiac Perfusionist  Referred by: Whitney Ayala MD  Primary Care Physician: Whitney Ayala MD  Date informed consent signed: 2024  I have reviewed the past medical history, family history, social history, medications and allergies listed in the medical record as obtained by my nursing staff and support staff and agree with their documentation.  Allergies, Medications, Medical history, Surgical history, Social history and Family history were reviewed and updated.  Ced  reports that he has never smoked. He has never used smokeless tobacco.  Ced has No Known Allergies.  Visit Number of Current Episode: 2  Initial Pain Ratin/10  Initial PROM 16% Oswestry    COVID-19 Screening:    Does the patient OR patient’s household members have any of the following symptoms?  Temperature: Fever ?100.0°F or ?37.8°C?  No  Respiratory symptoms: New or worsening cough, shortness of breath, or sore throat? No  GI symptoms: New onset of nausea, vomiting or diarrhea?  No  Miscellaneous: New onset of loss of taste or smell, chills, repeated shaking with chills, muscle pain or headache?  No  Has the patient or a household member tested positive for COVID-19 in the last 14 days?  No  Has the patient or a household member been tested for COVID-19 and are waiting for the results?  No    SOCIAL HISTORY:  Patient completed Chiropractic Patient History and Chiropractic Systems Review.  These were reviewed with the patient.    SUBJECTIVE:  The patient is a pleasant 42 year old male that presents to our office today for an evaluation and treatment of low back pain. Patient rates his pain today at ***/10 with 10 being worst.     Any change since last visit? ***  Can you describe the pain today?  Received request via: Pharmacy    Was the patient seen in the last year in this department? Yes    Does the patient have an active prescription (recently filled or refills available) for medication(s) requested? No    *  Future Appointments         Provider Department Center    7/19/2023 1:00 PM (Arrive by 12:45 PM) Yoli Mora P.A.-[facility] [place]            ***  Any change to your activities of daily living? ***   Patient had a recent flare up on 2/8/24 while sweeping and picking something off the ground. Pain is located to the lower left side. Patient has a hx of episodes of back pain similar to this. Flare ups are typically caused by bending forward. As the days went by he did feel a \"tingling\" sensation in his low back. Ced did take anti-inflammatory for 3 days, which helped.     Start date: 02/08/2024  Cause of pain (i.e. accident/injury): Bending over  Has this been an issue in the past: Yes   Past treatment:  Anti inflammatory  Current Pain scale (0-10, 10 being the worst, 0 being no pain):   Pain at its best 0-10: 1  Pain at its worst 0-10: 1  Pain description: aching and pulling  Increases pain: bending  and lifting  Reduces pain: lying down  Radiates: Yes, left buttocks. Tingling: Yes low back  Bowel/Bladder dysfunction: No  Saddle anesthesia: No  Sleep disturbance: NA  Headache: No     Relevant Diagnostic Imaging:   None for this complaint.    OBJECTIVE FINDINGS:   Patient Emotional screening was completed ; DoD/VA Pain Supplemental Questionnaire score was 0/40.    During the past 24 hours, how has pain interfered with normal activities: 0/10  During the past 24 hours, how has pain interfered with your sleep: 0/10  During the past 24 hours, how has pain affected your mood: 0/10  During the past 24 hours, how has pain contributed to your stress: 0/10    Patient Recorded Objective Measure is 16 % Oswestry    Problem Focused Examination revealed:    (Lumbar spine, sacrum and pelvis) Lumbar spine facet joint function is within normal limits except for his L2-L4 right facet joints, L3/L4 left facet joints that exhibited limited passive range of motion and segmental restriction and tenderness on palpation; sacroiliac joint function is within normal limits ; The following muscles were examined for flexibility and tone at rest; right hip flexor, left hip flexor,  right hip rotator, left hip rotator, right piriformis, left piriformis, right hamstring, left hamstring, right lumbar paraspinals, left lumbar paraspinals, right gluteus medius, left gluteus medius, right gluteus maxine, left gluteus maxine, right quadratus lumborum, left quadratus lumborum, right tensor fasciae latae, left tensor fasciae latae, right internal hip rotators, left internal hip rotators, right quadriceps, left quadriceps, right iliotibial band, left iliotibial band, and gluteus minimus muscles. These muscles were found to be within normal limits except for his left lumbar paraspinals muscle(s) that exhibited limited flexibility and were hypertonic at rest.    Orthopedic/Neurological tests:    There were no vitals taken for this visit.  He is mesomorphic in constitution. He is appropriately attired and of pleasant demeanor. Level of distress is mild. Peripheral pulses are strong. There is no apparent vascular deficit. Head is normocephalic. There is a lack of numbness in the upper and lower extremities. Orientation is x3. Gait and station are within normal limits. Lumbar active range of motion was within normal limits.     (Lower back)   Tenderness to palpation was not present in the lower back;  Straight leg raise did not provoke left radicular leg pain;   Aguilera’s test did not provoke left low back pain;   Slump test did not provoke left lower back pain;   Yeoman’s provoked left low back pain;   20 lb goblet squats did not provoke low back pain;   20 lb RDL did not provoke low back pain;    (Hip)   Bill’s FANNY test did not provoke left hip pain;     Directional Preference:   None    Assessment:   No diagnosis found.    Complicating Factors/Comorbidities:   Previous episodes of low back pain    PLAN:  Patient was evaluated and then treated with manipulation to his lumbar spine via side posture manipulation technique to improve function and passive range of motion of facet joints.  Patient also  treated with manual muscle manipulation to muscles noted as taut in objective findings to improve flexibility and decrease strain to spinal structures. Patient also treated with lumbar distraction x 4 minutes to stretch lumbar paraspinal muscle.      Therapeutic Exercise/Rehab/Modalities performed today:   Press Ups (2x10), Single leg RDL w/ slider (2x8/side), Superman (1x10). This was performed for 8-15 minutes.    Patient Instruction/Education: Perform Press Ups (1x10) 3x/day, Single leg RDL w/ slider (3x8/side, ), Superman (3x10) 2x/week.     Patient educated in the nature of condition and possible pain generators as well as plan of care to resolve symptoms and improve muscular and skeletal function.  Patient noted verbal understanding of condition and is agreeable to plan of care.  Patient stated understanding of, and was in agreement with, the discussed instructions.    Goals of Care: Goal of care is to improve muscular and skeletal function and provide symptom relief.   Additional Goals Include and to be obtained by end of this plan of care.   To be obtained by end of this plan of care:  Patient independent with modified and progressed home exercise program.  Patient will decrease symptoms by 60-80% of current Visual Analog Pain Scale Score.  Patient’s active range-of-motion will be within normal limits with no/minimal pain.   Patient will be able to bend and lift for activities of daily living and instrumental activities of daily living completion at home and work without pain/difficulty.  Patient’s DOD/VA pain questionnaire will be decreased by 50-70%.       Other treatment options discussed with patient: none    Patient rates his pain post treatment at ***/10 with 10 being worst.    Patient is to return next week for continued care and treatment of his condition consistent with plan of care. A trial of care will be performed consisting of 1 visits/week for 2 weeks, with a re-evaluation after 2  visits.    Treatment was tolerated without incident.     On 2/21/2024, Cait CARDONA scribed the services personally performed by Giovanni Clark DC    The documentation recorded by the scribe accurately and completely reflects the service(s) I personally performed and the decisions made by me.

## 2024-03-07 LAB
BUN SERPL-MCNC: 22 MG/DL (ref 8–27)
BUN/CREAT SERPL: 18 (ref 10–24)
CALCIUM SERPL-MCNC: 9.6 MG/DL (ref 8.6–10.2)
CHLORIDE SERPL-SCNC: 105 MMOL/L (ref 96–106)
CO2 SERPL-SCNC: 19 MMOL/L (ref 20–29)
CREAT SERPL-MCNC: 1.21 MG/DL (ref 0.76–1.27)
EGFRCR SERPLBLD CKD-EPI 2021: 66 ML/MIN/1.73
GLUCOSE SERPL-MCNC: 94 MG/DL (ref 70–99)
POTASSIUM SERPL-SCNC: 4.6 MMOL/L (ref 3.5–5.2)
SODIUM SERPL-SCNC: 140 MMOL/L (ref 134–144)

## 2024-03-18 DIAGNOSIS — E79.0 HYPERURICEMIA: ICD-10-CM

## 2024-03-18 NOTE — TELEPHONE ENCOUNTER
Received request via: Pharmacy    Was the patient seen in the last year in this department? Yes    Does the patient have an active prescription (recently filled or refills available) for medication(s) requested? No    [facility]    Does the patient have [facility] and need 100 day supply (blood pressure, diabetes and cholesterol meds only)? Patient does not have [facility]    Future Appointments         Provider Department Center    3/21/2024 11:40 AM (Arrive by 11:25 AM) Yoli Mora P.A.-[facility]

## 2024-03-19 RX ORDER — ALLOPURINOL 300 MG/1
TABLET ORAL
Qty: 90 TABLET | Refills: 1 | Status: SHIPPED | OUTPATIENT
Start: 2024-03-19

## 2024-03-21 ENCOUNTER — OFFICE VISIT (OUTPATIENT)
Dept: MEDICAL GROUP | Facility: MEDICAL CENTER | Age: 64
End: 2024-03-21
Payer: MEDICARE

## 2024-03-21 VITALS
HEART RATE: 56 BPM | DIASTOLIC BLOOD PRESSURE: 82 MMHG | BODY MASS INDEX: 29.09 KG/M2 | WEIGHT: 203.2 LBS | SYSTOLIC BLOOD PRESSURE: 148 MMHG | OXYGEN SATURATION: 98 % | TEMPERATURE: 97.9 F | RESPIRATION RATE: 16 BRPM | HEIGHT: 70 IN

## 2024-03-21 DIAGNOSIS — E78.49 OTHER HYPERLIPIDEMIA: ICD-10-CM

## 2024-03-21 DIAGNOSIS — I10 PRIMARY HYPERTENSION: ICD-10-CM

## 2024-03-21 DIAGNOSIS — E83.119 HEMOCHROMATOSIS, UNSPECIFIED HEMOCHROMATOSIS TYPE: ICD-10-CM

## 2024-03-21 PROCEDURE — 3079F DIAST BP 80-89 MM HG: CPT | Performed by: PHYSICIAN ASSISTANT

## 2024-03-21 PROCEDURE — 99214 OFFICE O/P EST MOD 30 MIN: CPT | Performed by: PHYSICIAN ASSISTANT

## 2024-03-21 PROCEDURE — 3077F SYST BP >= 140 MM HG: CPT | Performed by: PHYSICIAN ASSISTANT

## 2024-03-21 RX ORDER — SPIRONOLACTONE 50 MG/1
50 TABLET, FILM COATED ORAL DAILY
Qty: 30 TABLET | Refills: 3 | Status: SHIPPED | OUTPATIENT
Start: 2024-03-21

## 2024-03-21 ASSESSMENT — FIBROSIS 4 INDEX: FIB4 SCORE: 1.15

## 2024-03-21 NOTE — PROGRESS NOTES
"Chief Complaint   Patient presents with    Hypertension Follow-up       HPI  Charlie Avila is a 63 y.o. male here today for f/u on HTN    Patient continues on amlodipine 10 Mg daily on losartan 50 Mg daily was added last time.  Blood pressure has not improved significantly, patient states he does not feel good on medicine, feels more tired or has less stamina to workout, no lightheadedness or dizziness.  Patient has hemochromatosis, this phlebotomy regularly, last Hb and HCT P within normal limits.  Continues on lovastatin 20 Mg daily without any SE              Exam:  BP (!) 148/82 (BP Location: Left arm, Patient Position: Sitting)   Pulse (!) 56   Temp 36.6 °C (97.9 °F) (Temporal)   Resp 16   Ht 1.778 m (5' 10\")   Wt 92.2 kg (203 lb 3.2 oz)   SpO2 98%       Constitutional: Alert, oriented in no acute distress.  Psych: Eye contact is good, speech goal directed, affect calm  Eyes: Conjunctiva non-injected, sclera non-icteric.  Lungs: Unlabored respiratory effort, clear to auscultation bilaterally with good excursion, no wheez or rhonci  CV: regular rate and rhythm. No lower extremity edema        A/P:    1. Primary hypertension  Patient blood pressure seems somewhat resistant to treatment  Patient works out every day, weightlifting 2 days on 1 day off, on elliptical every day, no SOB or CP  Continue amlodipine 10 Mg daily, losartan 50 Mg daily, adding spironolactone, patient has no urinary frequency or nocturia, is worried about taking a diuretic, stayed away from hydrochlorothiazide since patient has gout    - Referral to Vascular Medicine  - spironolactone (ALDACTONE) 50 MG Tab; Take 1 Tablet by mouth every day.  Dispense: 30 Tablet; Refill: 3  - Basic Metabolic Panel; Future    2. Other hyperlipidemia  Chronic, stable, continue losartan 50 Mg daily    3. Hemochromatosis, unspecified hemochromatosis type  Continue with phlebotomy    Rechecking patient electrolytes and potassium since losartan was added " in about 6 weeks    Referral to vascular medicine for resistant hypertension    F/U:  3 months

## 2024-04-08 ENCOUNTER — TELEPHONE (OUTPATIENT)
Dept: HEALTH INFORMATION MANAGEMENT | Facility: OTHER | Age: 64
End: 2024-04-08
Payer: MEDICARE

## 2024-04-08 DIAGNOSIS — M25.50 ARTHRALGIA, UNSPECIFIED JOINT: ICD-10-CM

## 2024-04-09 RX ORDER — NAPROXEN 375 MG/1
375 TABLET ORAL 2 TIMES DAILY WITH MEALS
Qty: 60 TABLET | Refills: 0 | Status: SHIPPED | OUTPATIENT
Start: 2024-04-09

## 2024-06-03 DIAGNOSIS — M25.50 ARTHRALGIA, UNSPECIFIED JOINT: ICD-10-CM

## 2024-06-04 RX ORDER — NAPROXEN 375 MG/1
375 TABLET ORAL 2 TIMES DAILY WITH MEALS
Qty: 60 TABLET | Refills: 0 | Status: SHIPPED | OUTPATIENT
Start: 2024-06-04 | End: 2024-06-25 | Stop reason: SDUPTHER

## 2024-06-18 LAB
BUN SERPL-MCNC: 28 MG/DL (ref 8–27)
BUN/CREAT SERPL: 21 (ref 10–24)
CHLORIDE SERPL-SCNC: 104 MMOL/L (ref 96–106)
CO2 SERPL-SCNC: 20 MMOL/L (ref 20–29)
CREAT SERPL-MCNC: 1.36 MG/DL (ref 0.76–1.27)
EGFRCR SERPLBLD CKD-EPI 2021: 57 ML/MIN/1.73
GLUCOSE SERPL-MCNC: 92 MG/DL (ref 70–99)
POTASSIUM SERPL-SCNC: 5.2 MMOL/L (ref 3.5–5.2)
SODIUM SERPL-SCNC: 137 MMOL/L (ref 134–144)

## 2024-06-24 DIAGNOSIS — I10 PRIMARY HYPERTENSION: ICD-10-CM

## 2024-06-25 ENCOUNTER — OFFICE VISIT (OUTPATIENT)
Dept: MEDICAL GROUP | Facility: MEDICAL CENTER | Age: 64
End: 2024-06-25
Payer: MEDICARE

## 2024-06-25 VITALS
TEMPERATURE: 97.1 F | SYSTOLIC BLOOD PRESSURE: 136 MMHG | HEART RATE: 64 BPM | DIASTOLIC BLOOD PRESSURE: 68 MMHG | HEIGHT: 69 IN | RESPIRATION RATE: 20 BRPM | OXYGEN SATURATION: 98 % | BODY MASS INDEX: 29.15 KG/M2 | WEIGHT: 196.8 LBS

## 2024-06-25 DIAGNOSIS — E79.0 HYPERURICEMIA: ICD-10-CM

## 2024-06-25 DIAGNOSIS — Z12.5 SCREENING FOR MALIGNANT NEOPLASM OF PROSTATE: ICD-10-CM

## 2024-06-25 DIAGNOSIS — M10.9 GOUT, ARTHROPATHY: ICD-10-CM

## 2024-06-25 DIAGNOSIS — E83.119 HEMOCHROMATOSIS, UNSPECIFIED HEMOCHROMATOSIS TYPE: ICD-10-CM

## 2024-06-25 DIAGNOSIS — M25.50 ARTHRALGIA, UNSPECIFIED JOINT: ICD-10-CM

## 2024-06-25 DIAGNOSIS — E78.49 OTHER HYPERLIPIDEMIA: ICD-10-CM

## 2024-06-25 DIAGNOSIS — I10 PRIMARY HYPERTENSION: ICD-10-CM

## 2024-06-25 PROCEDURE — 99214 OFFICE O/P EST MOD 30 MIN: CPT | Performed by: PHYSICIAN ASSISTANT

## 2024-06-25 PROCEDURE — 3075F SYST BP GE 130 - 139MM HG: CPT | Performed by: PHYSICIAN ASSISTANT

## 2024-06-25 PROCEDURE — 3078F DIAST BP <80 MM HG: CPT | Performed by: PHYSICIAN ASSISTANT

## 2024-06-25 RX ORDER — AMLODIPINE BESYLATE 10 MG/1
10 TABLET ORAL DAILY
Qty: 90 TABLET | Refills: 0 | Status: SHIPPED | OUTPATIENT
Start: 2024-06-25 | End: 2024-06-25 | Stop reason: SDUPTHER

## 2024-06-25 RX ORDER — ALLOPURINOL 300 MG/1
TABLET ORAL
Qty: 90 TABLET | Refills: 3 | Status: SHIPPED | OUTPATIENT
Start: 2024-06-25

## 2024-06-25 RX ORDER — AMLODIPINE BESYLATE 10 MG/1
10 TABLET ORAL DAILY
Qty: 90 TABLET | Refills: 3 | Status: SHIPPED | OUTPATIENT
Start: 2024-06-25

## 2024-06-25 RX ORDER — NAPROXEN 375 MG/1
375 TABLET ORAL DAILY
Qty: 90 TABLET | Refills: 3 | Status: SHIPPED | OUTPATIENT
Start: 2024-06-25

## 2024-06-25 RX ORDER — SPIRONOLACTONE 50 MG/1
50 TABLET, FILM COATED ORAL DAILY
Qty: 30 TABLET | Refills: 3 | Status: SHIPPED | OUTPATIENT
Start: 2024-06-25

## 2024-06-25 RX ORDER — LOSARTAN POTASSIUM 50 MG/1
50 TABLET ORAL DAILY
Qty: 90 TABLET | Refills: 3 | Status: SHIPPED | OUTPATIENT
Start: 2024-06-25

## 2024-06-25 RX ORDER — LOVASTATIN 20 MG/1
20 TABLET ORAL DAILY
Qty: 90 TABLET | Refills: 3 | Status: SHIPPED | OUTPATIENT
Start: 2024-06-25

## 2024-06-25 ASSESSMENT — FIBROSIS 4 INDEX: FIB4 SCORE: 1.17

## 2024-06-25 NOTE — PROGRESS NOTES
"Chief Complaint   Patient presents with    Follow-Up       HPI  Charlie Avila is a 64 y.o. male here today for f/u:    Pt has  HTN, BP at goal since spironolactone 50 Mg daily was added.  Denies any new shortness of breath, palpitations, or chest pain, lower extremity edema.  However states he feels hung over every morning when he wakes up until about 10 AM after he is done with his workout.  This is only started when he started blood pressure medications.    Continues on lovastatin 20 Mg daily for hypercholesterolemia without any SE.  States he never had any SE on this med    Continues on albuterol for gout, no recent gout attacks.    Patient last labs shows elevated creatinine and decreased GFR.  He takes Naprosyn 325 Mg daily every night for arthralgia so he can sleep.  However he thinks abnormal labs are result of dehydration since he loves to lay by the pool                  Exam:  /68 (BP Location: Left arm, Patient Position: Sitting)   Pulse 64   Temp 36.2 °C (97.1 °F) (Temporal)   Resp 20   Ht 1.753 m (5' 9\")   Wt 89.3 kg (196 lb 12.8 oz)   SpO2 98%       Constitutional: Alert, oriented in no acute distress.  Psych: Eye contact is good, speech goal directed, affect calm  Eyes: Conjunctiva non-injected, sclera non-icteric.  Lungs: Unlabored respiratory effort, clear to auscultation bilaterally with good excursion, no wheez or rhonci  CV: regular rate and rhythm. No lower extremity edema        A/P:  1. Other hyperlipidemia    - Lipid Profile; Future  - lovastatin (MEVACOR) 20 MG Tab; Take 1 Tablet by mouth every day.  Dispense: 90 Tablet; Refill: 3    2. Gout, arthropathy    - URIC ACID, SERUM    3. Screening for malignant neoplasm of prostate    - PROSTATE SPECIFIC AG SCREENING; Future    4. Primary hypertension  Chronic, controlled, continue following meds  - Comp Metabolic Panel; Future  - MICROALBUMIN CREAT RATIO URINE; Future  - amLODIPine (NORVASC) 10 MG Tab; Take 1 Tablet by mouth every " day.  Dispense: 90 Tablet; Refill: 3  - losartan (COZAAR) 50 MG Tab; Take 1 Tablet by mouth every day.  Dispense: 90 Tablet; Refill: 3  - spironolactone (ALDACTONE) 50 MG Tab; Take 1 Tablet by mouth every day.  Dispense: 30 Tablet; Refill: 3    5. Hemochromatosis, unspecified hemochromatosis type    - CBC WITH DIFFERENTIAL; Future  - IRON/TOTAL IRON BIND; Future  - FERRITIN; Future    6. Hyperuricemia  Chronic, controlled, continue allopurinol  - allopurinol (ZYLOPRIM) 300 MG Tab; TAKE ONE TABLET BY MOUTH ONCE DAILY  Dispense: 90 Tablet; Refill: 3    7. Arthralgia, unspecified joint  Cyst patient GFR has been decreased, advised patient to  Alternate between Naprosyn and Tylenol  - naproxen (NAPROSYN) 375 MG Tab; Take 1 Tablet by mouth every day.  Dispense: 90 Tablet; Refill: 3        F/U: 3-6 months

## 2024-08-19 ENCOUNTER — PATIENT MESSAGE (OUTPATIENT)
Dept: MEDICAL GROUP | Facility: MEDICAL CENTER | Age: 64
End: 2024-08-19
Payer: MEDICARE

## 2024-08-19 DIAGNOSIS — I10 PRIMARY HYPERTENSION: ICD-10-CM

## 2024-08-19 DIAGNOSIS — B00.9 HSV INFECTION: ICD-10-CM

## 2024-08-22 RX ORDER — VALACYCLOVIR HYDROCHLORIDE 1 G/1
1000 TABLET, FILM COATED ORAL DAILY
Qty: 12 TABLET | Refills: 8 | Status: SHIPPED | OUTPATIENT
Start: 2024-08-22

## 2024-08-22 RX ORDER — SPIRONOLACTONE 50 MG/1
50 TABLET, FILM COATED ORAL DAILY
Qty: 90 TABLET | Refills: 3 | Status: SHIPPED | OUTPATIENT
Start: 2024-08-22

## 2024-08-28 LAB
ALBUMIN SERPL-MCNC: 4.5 G/DL (ref 3.9–4.9)
ALBUMIN/CREAT UR: <3 MG/G CREAT (ref 0–29)
ALP SERPL-CCNC: 49 IU/L (ref 44–121)
ALT SERPL-CCNC: 27 IU/L (ref 0–44)
AST SERPL-CCNC: 26 IU/L (ref 0–40)
BASOPHILS # BLD AUTO: 0 X10E3/UL (ref 0–0.2)
BASOPHILS NFR BLD AUTO: 1 %
BILIRUB SERPL-MCNC: 0.6 MG/DL (ref 0–1.2)
BUN SERPL-MCNC: 25 MG/DL (ref 8–27)
BUN/CREAT SERPL: 18 (ref 10–24)
CALCIUM SERPL-MCNC: 9.2 MG/DL (ref 8.6–10.2)
CHLORIDE SERPL-SCNC: 102 MMOL/L (ref 96–106)
CHOLEST SERPL-MCNC: 142 MG/DL (ref 100–199)
CO2 SERPL-SCNC: 18 MMOL/L (ref 20–29)
CREAT SERPL-MCNC: 1.36 MG/DL (ref 0.76–1.27)
CREAT UR-MCNC: 111.4 MG/DL
EGFRCR SERPLBLD CKD-EPI 2021: 57 ML/MIN/1.73
EOSINOPHIL # BLD AUTO: 0.1 X10E3/UL (ref 0–0.4)
EOSINOPHIL NFR BLD AUTO: 1 %
ERYTHROCYTE [DISTWIDTH] IN BLOOD BY AUTOMATED COUNT: 14.5 % (ref 11.6–15.4)
FERRITIN SERPL-MCNC: 39 NG/ML (ref 30–400)
GLOBULIN SER CALC-MCNC: 2.3 G/DL (ref 1.5–4.5)
GLUCOSE SERPL-MCNC: 102 MG/DL (ref 70–99)
HCT VFR BLD AUTO: 43.2 % (ref 37.5–51)
HDLC SERPL-MCNC: 44 MG/DL
HGB BLD-MCNC: 13.7 G/DL (ref 13–17.7)
IMM GRANULOCYTES # BLD AUTO: 0 X10E3/UL (ref 0–0.1)
IMM GRANULOCYTES NFR BLD AUTO: 0 %
IMMATURE CELLS  115398: NORMAL
IRON SATN MFR SERPL: 41 % (ref 15–55)
IRON SERPL-MCNC: 151 UG/DL (ref 38–169)
LDL CALC COMMENT:: NORMAL
LDLC SERPL CALC-MCNC: 83 MG/DL (ref 0–99)
LYMPHOCYTES # BLD AUTO: 1.8 X10E3/UL (ref 0.7–3.1)
LYMPHOCYTES NFR BLD AUTO: 25 %
MCH RBC QN AUTO: 28.9 PG (ref 26.6–33)
MCHC RBC AUTO-ENTMCNC: 31.7 G/DL (ref 31.5–35.7)
MCV RBC AUTO: 91 FL (ref 79–97)
MICROALBUMIN UR-MCNC: <3 UG/ML
MONOCYTES # BLD AUTO: 0.5 X10E3/UL (ref 0.1–0.9)
MONOCYTES NFR BLD AUTO: 7 %
MORPHOLOGY BLD-IMP: NORMAL
NEUTROPHILS # BLD AUTO: 4.8 X10E3/UL (ref 1.4–7)
NEUTROPHILS NFR BLD AUTO: 66 %
NRBC BLD AUTO-RTO: NORMAL %
PLATELET # BLD AUTO: 208 X10E3/UL (ref 150–450)
POTASSIUM SERPL-SCNC: 4.5 MMOL/L (ref 3.5–5.2)
PROT SERPL-MCNC: 6.8 G/DL (ref 6–8.5)
PSA SERPL-MCNC: 2.1 NG/ML (ref 0–4)
RBC # BLD AUTO: 4.74 X10E6/UL (ref 4.14–5.8)
SODIUM SERPL-SCNC: 134 MMOL/L (ref 134–144)
TIBC SERPL-MCNC: 366 UG/DL (ref 250–450)
TRIGL SERPL-MCNC: 73 MG/DL (ref 0–149)
UIBC SERPL-MCNC: 215 UG/DL (ref 111–343)
URATE SERPL-MCNC: 3.9 MG/DL (ref 3.8–8.4)
VLDLC SERPL CALC-MCNC: 15 MG/DL (ref 5–40)
WBC # BLD AUTO: 7.2 X10E3/UL (ref 3.4–10.8)

## 2024-12-13 SDOH — ECONOMIC STABILITY: INCOME INSECURITY: IN THE LAST 12 MONTHS, WAS THERE A TIME WHEN YOU WERE NOT ABLE TO PAY THE MORTGAGE OR RENT ON TIME?: NO

## 2024-12-13 SDOH — HEALTH STABILITY: PHYSICAL HEALTH: ON AVERAGE, HOW MANY DAYS PER WEEK DO YOU ENGAGE IN MODERATE TO STRENUOUS EXERCISE (LIKE A BRISK WALK)?: 7 DAYS

## 2024-12-13 SDOH — ECONOMIC STABILITY: FOOD INSECURITY: WITHIN THE PAST 12 MONTHS, THE FOOD YOU BOUGHT JUST DIDN'T LAST AND YOU DIDN'T HAVE MONEY TO GET MORE.: NEVER TRUE

## 2024-12-13 SDOH — ECONOMIC STABILITY: INCOME INSECURITY: HOW HARD IS IT FOR YOU TO PAY FOR THE VERY BASICS LIKE FOOD, HOUSING, MEDICAL CARE, AND HEATING?: NOT HARD AT ALL

## 2024-12-13 SDOH — ECONOMIC STABILITY: FOOD INSECURITY: WITHIN THE PAST 12 MONTHS, YOU WORRIED THAT YOUR FOOD WOULD RUN OUT BEFORE YOU GOT MONEY TO BUY MORE.: NEVER TRUE

## 2024-12-13 SDOH — HEALTH STABILITY: MENTAL HEALTH
STRESS IS WHEN SOMEONE FEELS TENSE, NERVOUS, ANXIOUS, OR CAN'T SLEEP AT NIGHT BECAUSE THEIR MIND IS TROUBLED. HOW STRESSED ARE YOU?: TO SOME EXTENT

## 2024-12-13 SDOH — HEALTH STABILITY: PHYSICAL HEALTH: ON AVERAGE, HOW MANY MINUTES DO YOU ENGAGE IN EXERCISE AT THIS LEVEL?: 90 MIN

## 2024-12-13 ASSESSMENT — SOCIAL DETERMINANTS OF HEALTH (SDOH)
HOW OFTEN DO YOU GET TOGETHER WITH FRIENDS OR RELATIVES?: TWICE A WEEK
HOW OFTEN DO YOU HAVE SIX OR MORE DRINKS ON ONE OCCASION: NEVER
IN THE PAST 12 MONTHS, HAS THE ELECTRIC, GAS, OIL, OR WATER COMPANY THREATENED TO SHUT OFF SERVICE IN YOUR HOME?: NO
DO YOU BELONG TO ANY CLUBS OR ORGANIZATIONS SUCH AS CHURCH GROUPS UNIONS, FRATERNAL OR ATHLETIC GROUPS, OR SCHOOL GROUPS?: NO
WITHIN THE PAST 12 MONTHS, YOU WORRIED THAT YOUR FOOD WOULD RUN OUT BEFORE YOU GOT THE MONEY TO BUY MORE: NEVER TRUE
IN A TYPICAL WEEK, HOW MANY TIMES DO YOU TALK ON THE PHONE WITH FAMILY, FRIENDS, OR NEIGHBORS?: MORE THAN THREE TIMES A WEEK
HOW OFTEN DO YOU HAVE A DRINK CONTAINING ALCOHOL: 2-4 TIMES A MONTH
DO YOU BELONG TO ANY CLUBS OR ORGANIZATIONS SUCH AS CHURCH GROUPS UNIONS, FRATERNAL OR ATHLETIC GROUPS, OR SCHOOL GROUPS?: NO
HOW OFTEN DO YOU ATTENT MEETINGS OF THE CLUB OR ORGANIZATION YOU BELONG TO?: NEVER
HOW HARD IS IT FOR YOU TO PAY FOR THE VERY BASICS LIKE FOOD, HOUSING, MEDICAL CARE, AND HEATING?: NOT HARD AT ALL
HOW OFTEN DO YOU ATTEND CHURCH OR RELIGIOUS SERVICES?: NEVER
HOW OFTEN DO YOU ATTENT MEETINGS OF THE CLUB OR ORGANIZATION YOU BELONG TO?: NEVER
HOW MANY DRINKS CONTAINING ALCOHOL DO YOU HAVE ON A TYPICAL DAY WHEN YOU ARE DRINKING: 1 OR 2
HOW OFTEN DO YOU GET TOGETHER WITH FRIENDS OR RELATIVES?: TWICE A WEEK
HOW OFTEN DO YOU ATTEND CHURCH OR RELIGIOUS SERVICES?: NEVER
IN A TYPICAL WEEK, HOW MANY TIMES DO YOU TALK ON THE PHONE WITH FAMILY, FRIENDS, OR NEIGHBORS?: MORE THAN THREE TIMES A WEEK

## 2024-12-13 ASSESSMENT — LIFESTYLE VARIABLES
AUDIT-C TOTAL SCORE: 2
HOW OFTEN DO YOU HAVE A DRINK CONTAINING ALCOHOL: 2-4 TIMES A MONTH
HOW MANY STANDARD DRINKS CONTAINING ALCOHOL DO YOU HAVE ON A TYPICAL DAY: 1 OR 2
HOW OFTEN DO YOU HAVE SIX OR MORE DRINKS ON ONE OCCASION: NEVER
SKIP TO QUESTIONS 9-10: 1

## 2024-12-16 ENCOUNTER — OFFICE VISIT (OUTPATIENT)
Dept: MEDICAL GROUP | Facility: MEDICAL CENTER | Age: 64
End: 2024-12-16
Payer: MEDICARE

## 2024-12-16 VITALS
WEIGHT: 197.8 LBS | SYSTOLIC BLOOD PRESSURE: 118 MMHG | OXYGEN SATURATION: 96 % | BODY MASS INDEX: 29.3 KG/M2 | HEIGHT: 69 IN | HEART RATE: 71 BPM | TEMPERATURE: 97.3 F | DIASTOLIC BLOOD PRESSURE: 68 MMHG

## 2024-12-16 DIAGNOSIS — I10 PRIMARY HYPERTENSION: ICD-10-CM

## 2024-12-16 DIAGNOSIS — E83.119 HEMOCHROMATOSIS, UNSPECIFIED HEMOCHROMATOSIS TYPE: ICD-10-CM

## 2024-12-16 DIAGNOSIS — R94.4 DECREASED GFR: ICD-10-CM

## 2024-12-16 DIAGNOSIS — Z12.5 SCREENING FOR MALIGNANT NEOPLASM OF PROSTATE: ICD-10-CM

## 2024-12-16 DIAGNOSIS — E78.49 OTHER HYPERLIPIDEMIA: ICD-10-CM

## 2024-12-16 DIAGNOSIS — M10.9 GOUT, ARTHROPATHY: ICD-10-CM

## 2024-12-16 PROCEDURE — 3074F SYST BP LT 130 MM HG: CPT | Performed by: PHYSICIAN ASSISTANT

## 2024-12-16 PROCEDURE — 3078F DIAST BP <80 MM HG: CPT | Performed by: PHYSICIAN ASSISTANT

## 2024-12-16 PROCEDURE — 99214 OFFICE O/P EST MOD 30 MIN: CPT | Performed by: PHYSICIAN ASSISTANT

## 2024-12-16 ASSESSMENT — FIBROSIS 4 INDEX: FIB4 SCORE: 1.587713240271470853

## 2024-12-18 NOTE — PROGRESS NOTES
"Chief Complaint   Patient presents with    F/u on chronic problems          Charlie Avila is a 64 y.o. male here today for f.u:    Patient with chronic lower back pain takes 1 Naprosyn nightly, GFR has been slightly decreased however stable.  Will have it rechecked      Patient has gout without any recent attacks, continues on allopurinol 300 Mg daily,    He has hyperchromic ptosis however on his blood's 4 times a year when does doubles, numbers up and is stable      Pt has  HTN, BP at goal, continues on meds w/o SE, patient reports that he always feels tired after taking blood pressure medicine and feels like his stamina is less when he exercises however this is not new and does not have any if he can underestimate.    Denies any new shortness of breath, palpitations, or chest pain, lower extremity edema.        Exam:  /68 (BP Location: Right arm, Patient Position: Sitting, BP Cuff Size: Adult)   Pulse 71   Temp 36.3 °C (97.3 °F) (Temporal)   Ht 1.753 m (5' 9\")   Wt 89.7 kg (197 lb 12.8 oz)   SpO2 96%       Constitutional: Alert, oriented in no acute distress.  Psych: Eye contact is good, speech goal directed, affect calm  Eyes: Conjunctiva non-injected, sclera non-icteric.  Lungs: Unlabored respiratory effort, clear to auscultation bilaterally with good excursion, no wheez or rhonci  CV: regular rate and rhythm. No lower extremity edema        A/P:  1. Decreased GFR  Chronic, stable, continue monitoring GFR as patient really needs his Naprosyn daily, however is ever if GFR decreases more and have to discontinue  - Basic Metabolic Panel; Future    2. Gout, arthropathy  Chronic, controlled, continue allopurinol 300 mg daily  - URIC ACID, SERUM    3. Other hyperlipidemia  Chronic, stable, continue dose diabetic kidney  - Lipid Profile; Future    4. Hemochromatosis, unspecified hemochromatosis type  Chronic, continues with blood donation  - CBC WITH DIFFERENTIAL; Future  - IRON/TOTAL IRON BIND; " Future  - FERRITIN; Future    5. Primary hypertension  Chronic, controlled, continue amlodipine 10 Mg daily and spironolactone 50 Mg daily and losartan 50 Mg  - Comp Metabolic Panel; Future  - MICROALBUMIN CREAT RATIO URINE; Future    6. Screening for malignant neoplasm of prostate    - PROSTATE SPECIFIC AG SCREENING; Future      F/U: 6 months follow-up

## 2025-02-12 LAB
BUN SERPL-MCNC: 30 MG/DL (ref 8–27)
BUN/CREAT SERPL: 19 (ref 10–24)
CALCIUM SERPL-MCNC: 11.2 MG/DL (ref 8.6–10.2)
CHLORIDE SERPL-SCNC: 105 MMOL/L (ref 96–106)
CO2 SERPL-SCNC: 17 MMOL/L (ref 20–29)
CREAT SERPL-MCNC: 1.57 MG/DL (ref 0.76–1.27)
EGFRCR SERPLBLD CKD-EPI 2021: 48 ML/MIN/1.73
GLUCOSE SERPL-MCNC: 106 MG/DL (ref 70–99)
POTASSIUM SERPL-SCNC: 5.1 MMOL/L (ref 3.5–5.2)
SODIUM SERPL-SCNC: 142 MMOL/L (ref 134–144)

## 2025-02-13 ENCOUNTER — RESULTS FOLLOW-UP (OUTPATIENT)
Dept: MEDICAL GROUP | Facility: MEDICAL CENTER | Age: 65
End: 2025-02-13

## 2025-02-13 DIAGNOSIS — R94.4 DECREASED GFR: ICD-10-CM

## 2025-03-18 ENCOUNTER — RESULTS FOLLOW-UP (OUTPATIENT)
Dept: MEDICAL GROUP | Facility: MEDICAL CENTER | Age: 65
End: 2025-03-18

## 2025-03-18 LAB
BUN SERPL-MCNC: 26 MG/DL (ref 8–27)
BUN/CREAT SERPL: 18 (ref 10–24)
CALCIUM SERPL-MCNC: 9.5 MG/DL (ref 8.6–10.2)
CHLORIDE SERPL-SCNC: 101 MMOL/L (ref 96–106)
CO2 SERPL-SCNC: 20 MMOL/L (ref 20–29)
CREAT SERPL-MCNC: 1.42 MG/DL (ref 0.76–1.27)
EGFRCR SERPLBLD CKD-EPI 2021: 54 ML/MIN/1.73
GLUCOSE SERPL-MCNC: 95 MG/DL (ref 70–99)
POTASSIUM SERPL-SCNC: 4.9 MMOL/L (ref 3.5–5.2)
SODIUM SERPL-SCNC: 135 MMOL/L (ref 134–144)

## 2025-07-05 DIAGNOSIS — E78.49 OTHER HYPERLIPIDEMIA: ICD-10-CM

## 2025-07-05 DIAGNOSIS — I10 PRIMARY HYPERTENSION: ICD-10-CM

## 2025-07-07 NOTE — TELEPHONE ENCOUNTER
Received request via: Patient    Was the patient seen in the last year in this department? Yes    Does the patient have an active prescription (recently filled or refills available) for medication(s) requested? No    Ph[facility] Name[facility]    Does the patient have [facility] and need 100-day supply? (This applies to ALL medications) Patient does not have [facility]

## 2025-07-08 RX ORDER — LOVASTATIN 20 MG/1
20 TABLET ORAL DAILY
Qty: 90 TABLET | Refills: 3 | Status: SHIPPED | OUTPATIENT
Start: 2025-07-08

## 2025-07-08 RX ORDER — LOSARTAN POTASSIUM 50 MG/1
50 TABLET ORAL DAILY
Qty: 90 TABLET | Refills: 3 | Status: SHIPPED | OUTPATIENT
Start: 2025-07-08

## 2025-08-05 LAB
ALBUMIN SERPL-MCNC: 4.8 G/DL (ref 3.9–4.9)
ALBUMIN/CREAT UR: 3 MG/G CREAT (ref 0–29)
ALP SERPL-CCNC: 49 IU/L (ref 44–121)
ALT SERPL-CCNC: 28 IU/L (ref 0–44)
AST SERPL-CCNC: 27 IU/L (ref 0–40)
BASOPHILS # BLD AUTO: 0 X10E3/UL (ref 0–0.2)
BASOPHILS NFR BLD AUTO: 1 %
BILIRUB SERPL-MCNC: 0.5 MG/DL (ref 0–1.2)
BUN SERPL-MCNC: 25 MG/DL (ref 8–27)
BUN/CREAT SERPL: 17 (ref 10–24)
CALCIUM SERPL-MCNC: 9.7 MG/DL (ref 8.6–10.2)
CHLORIDE SERPL-SCNC: 102 MMOL/L (ref 96–106)
CHOLEST SERPL-MCNC: 151 MG/DL (ref 100–199)
CO2 SERPL-SCNC: 17 MMOL/L (ref 20–29)
CREAT SERPL-MCNC: 1.51 MG/DL (ref 0.76–1.27)
CREAT UR-MCNC: 108.3 MG/DL
EGFRCR SERPLBLD CKD-EPI 2021: 50 ML/MIN/1.73
EOSINOPHIL # BLD AUTO: 0.1 X10E3/UL (ref 0–0.4)
EOSINOPHIL NFR BLD AUTO: 1 %
ERYTHROCYTE [DISTWIDTH] IN BLOOD BY AUTOMATED COUNT: 15.4 % (ref 11.6–15.4)
FERRITIN SERPL-MCNC: 28 NG/ML (ref 30–400)
GLOBULIN SER CALC-MCNC: 2.5 G/DL (ref 1.5–4.5)
GLUCOSE SERPL-MCNC: 98 MG/DL (ref 70–99)
HCT VFR BLD AUTO: 43.2 % (ref 37.5–51)
HDLC SERPL-MCNC: 40 MG/DL
HGB BLD-MCNC: 13.4 G/DL (ref 13–17.7)
IMM GRANULOCYTES # BLD AUTO: 0 X10E3/UL (ref 0–0.1)
IMM GRANULOCYTES NFR BLD AUTO: 0 %
IMMATURE CELLS  115398: ABNORMAL
IRON SATN MFR SERPL: 16 % (ref 15–55)
IRON SERPL-MCNC: 66 UG/DL (ref 38–169)
LDL CALC COMMENT:: NORMAL
LDLC SERPL CALC-MCNC: 98 MG/DL (ref 0–99)
LYMPHOCYTES # BLD AUTO: 1.8 X10E3/UL (ref 0.7–3.1)
LYMPHOCYTES NFR BLD AUTO: 31 %
MCH RBC QN AUTO: 27.9 PG (ref 26.6–33)
MCHC RBC AUTO-ENTMCNC: 31 G/DL (ref 31.5–35.7)
MCV RBC AUTO: 90 FL (ref 79–97)
MICROALBUMIN UR-MCNC: 3.1 UG/ML
MONOCYTES # BLD AUTO: 0.5 X10E3/UL (ref 0.1–0.9)
MONOCYTES NFR BLD AUTO: 8 %
MORPHOLOGY BLD-IMP: ABNORMAL
NEUTROPHILS # BLD AUTO: 3.3 X10E3/UL (ref 1.4–7)
NEUTROPHILS NFR BLD AUTO: 59 %
NRBC BLD AUTO-RTO: ABNORMAL %
PLATELET # BLD AUTO: 187 X10E3/UL (ref 150–450)
POTASSIUM SERPL-SCNC: 5.1 MMOL/L (ref 3.5–5.2)
PROT SERPL-MCNC: 7.3 G/DL (ref 6–8.5)
PSA SERPL-MCNC: 1.7 NG/ML (ref 0–4)
RBC # BLD AUTO: 4.8 X10E6/UL (ref 4.14–5.8)
SODIUM SERPL-SCNC: 135 MMOL/L (ref 134–144)
TIBC SERPL-MCNC: 416 UG/DL (ref 250–450)
TRIGL SERPL-MCNC: 62 MG/DL (ref 0–149)
UIBC SERPL-MCNC: 350 UG/DL (ref 111–343)
URATE SERPL-MCNC: 3.9 MG/DL (ref 3.8–8.4)
VLDLC SERPL CALC-MCNC: 13 MG/DL (ref 5–40)
WBC # BLD AUTO: 5.7 X10E3/UL (ref 3.4–10.8)

## 2025-08-09 DIAGNOSIS — M25.50 ARTHRALGIA, UNSPECIFIED JOINT: ICD-10-CM

## 2025-08-12 RX ORDER — NAPROXEN 375 MG/1
375 TABLET ORAL DAILY
Qty: 90 TABLET | Refills: 0 | Status: SHIPPED | OUTPATIENT
Start: 2025-08-12

## 2025-08-19 ENCOUNTER — OFFICE VISIT (OUTPATIENT)
Dept: MEDICAL GROUP | Facility: MEDICAL CENTER | Age: 65
End: 2025-08-19
Payer: MEDICARE

## 2025-08-19 VITALS
HEART RATE: 86 BPM | HEIGHT: 70 IN | OXYGEN SATURATION: 94 % | WEIGHT: 204.6 LBS | DIASTOLIC BLOOD PRESSURE: 72 MMHG | SYSTOLIC BLOOD PRESSURE: 128 MMHG | BODY MASS INDEX: 29.29 KG/M2 | TEMPERATURE: 97.5 F

## 2025-08-19 DIAGNOSIS — Z12.11 COLON CANCER SCREENING: ICD-10-CM

## 2025-08-19 DIAGNOSIS — R94.4 DECREASED GFR: ICD-10-CM

## 2025-08-19 DIAGNOSIS — E83.119 HEMOCHROMATOSIS, UNSPECIFIED HEMOCHROMATOSIS TYPE: Primary | ICD-10-CM

## 2025-08-19 DIAGNOSIS — I10 PRIMARY HYPERTENSION: ICD-10-CM

## 2025-08-19 PROCEDURE — 99214 OFFICE O/P EST MOD 30 MIN: CPT | Performed by: PHYSICIAN ASSISTANT

## 2025-08-19 PROCEDURE — 3074F SYST BP LT 130 MM HG: CPT | Performed by: PHYSICIAN ASSISTANT

## 2025-08-19 PROCEDURE — 3078F DIAST BP <80 MM HG: CPT | Performed by: PHYSICIAN ASSISTANT

## 2025-08-19 ASSESSMENT — PATIENT HEALTH QUESTIONNAIRE - PHQ9: CLINICAL INTERPRETATION OF PHQ2 SCORE: 0

## 2025-08-19 ASSESSMENT — FIBROSIS 4 INDEX: FIB4 SCORE: 1.83
